# Patient Record
Sex: MALE | Race: OTHER | Employment: FULL TIME | URBAN - METROPOLITAN AREA
[De-identification: names, ages, dates, MRNs, and addresses within clinical notes are randomized per-mention and may not be internally consistent; named-entity substitution may affect disease eponyms.]

---

## 2017-07-13 ENCOUNTER — OFFICE VISIT (OUTPATIENT)
Dept: FAMILY MEDICINE CLINIC | Age: 42
End: 2017-07-13

## 2017-07-13 VITALS
OXYGEN SATURATION: 95 % | HEART RATE: 67 BPM | TEMPERATURE: 98.6 F | DIASTOLIC BLOOD PRESSURE: 76 MMHG | HEIGHT: 72 IN | RESPIRATION RATE: 17 BRPM | BODY MASS INDEX: 31.64 KG/M2 | SYSTOLIC BLOOD PRESSURE: 117 MMHG | WEIGHT: 233.6 LBS

## 2017-07-13 DIAGNOSIS — G62.9 NEUROPATHY: Primary | ICD-10-CM

## 2017-07-13 DIAGNOSIS — E66.9 NON MORBID OBESITY, UNSPECIFIED OBESITY TYPE: ICD-10-CM

## 2017-07-13 PROBLEM — M21.42 PES PLANUS OF BOTH FEET: Status: ACTIVE | Noted: 2017-07-13

## 2017-07-13 PROBLEM — M21.41 PES PLANUS OF BOTH FEET: Status: ACTIVE | Noted: 2017-07-13

## 2017-07-13 RX ORDER — GABAPENTIN 100 MG/1
100 CAPSULE ORAL 3 TIMES DAILY
Qty: 90 CAP | Refills: 0 | Status: SHIPPED | OUTPATIENT
Start: 2017-07-13 | End: 2018-04-23 | Stop reason: DRUGHIGH

## 2017-07-13 NOTE — LETTER
NOTIFICATION RETURN TO WORK / SCHOOL 
 
7/13/2017 2:53 PM 
 
Mr. Aurora Eng [address] [place] [id number] To Whom It May Concern: 
 
Aurora Eng is currently under the care of [facility]. He will return to work/school on: 7/14/17 If there are questions or concerns please have the patient contact our office.  
 
 
 
Sincerely, 
 
 
Cheryl Carrero MD

## 2017-07-13 NOTE — PATIENT INSTRUCTIONS
Neuropathic Pain: Care Instructions  Your Care Instructions  Neuropathic pain is caused by pressure on or damage to your nerves. It's often simply called nerve pain. Some people feel this type of pain all the time. For others, it comes and goes. Diabetes, shingles, or an injury can cause nerve pain. Many people say the pain feels sharp, burning, or stabbing. But some people feel it as a dull ache. In some cases, it makes your skin very sensitive. So touch, pressure, and other sensations that did not hurt before may now cause pain. It's important to know that this kind of pain is real and can affect your quality of life. It's also important to know that treatment can help. Treatment includes pain medicines, exercise, and physical therapy. Medicines can help reduce the number of pain signals that travel over the nerves. This can make the painful areas less sensitive. It can also help you sleep better and improve your mood. But medicines are only one part of successful treatment. Most people do best with more than one kind of treatment. Your doctor may recommend that you try cognitive-behavioral therapy and stress management. Or, if needed, you may decide to try to quit smoking, lower your blood pressure, or better control blood sugar. These kinds of healthy changes can also make a difference. If you feel that your treatment is not working, talk to your doctor. And be sure to tell your doctor if you think you might be depressed or anxious. These are common problems that can also be treated. Follow-up care is a key part of your treatment and safety. Be sure to make and go to all appointments, and call your doctor if you are having problems. It's also a good idea to know your test results and keep a list of the medicines you take. How can you care for yourself at home? · Be safe with medicines. Read and follow all instructions on the label.   ¨ If the doctor gave you a prescription medicine for pain, take it as prescribed. ¨ If you are not taking a prescription pain medicine, ask your doctor if you can take an over-the-counter medicine. · Save hard tasks for days when you have less pain. Follow a hard task with an easy task. And remember to take breaks. · Relax, and reduce stress. You may want to try deep breathing or meditation. These can help. · Keep moving. Gentle, daily exercise can help reduce pain. Your doctor or physical therapist can tell you what type of exercise is best for you. This may include walking, swimming, and stationary biking. It may also include stretches and range-of-motion exercises. · Try heat, cold packs, and massage. · Get enough sleep. Constant pain can make you more tired. If the pain makes it hard to sleep, talk with your doctor. · Think positively. Your thoughts can affect your pain. Do fun things to distract yourself from the pain. See a movie, read a book, listen to music, or spend time with a friend. · Keep a pain diary. Try to write down how strong your pain is and what it feels like. Also try to notice and write down how your moods, thoughts, sleep, activities, and medicine affect your pain. These notes can help you and your doctor find the best ways to treat your pain. Reducing constipation caused by pain medicine  Pain medicines often cause constipation. To reduce constipation:  · Include fruits, vegetables, beans, and whole grains in your diet each day. These foods are high in fiber. · Drink plenty of fluids, enough so that your urine is light yellow or clear like water. If you have kidney, heart, or liver disease and have to limit fluids, talk with your doctor before you increase the amount of fluids you drink. · Get some exercise every day. Build up slowly to 30 to 60 minutes a day on 5 or more days of the week. · Take a fiber supplement, such as Citrucel or Metamucil, every day if needed. Read and follow all instructions on the label.   · Schedule time each day for a bowel movement. Having a daily routine may help. Take your time and do not strain when having a bowel movement. · Ask your doctor about a laxative. The goal is to have one easy bowel movement every 1 to 2 days. Do not let constipation go untreated for more than 3 days. When should you call for help? Call your doctor now or seek immediate medical care if:  · You feel sad, anxious, or hopeless for more than a few days. This could mean you are depressed. Depression is common in people who have a lot of pain. But it can be treated. · You have trouble with bowel movements, such as:  ¨ No bowel movement in 3 days. ¨ Blood in the anal area, in your stool, or on the toilet paper. ¨ Diarrhea for more than 24 hours. Watch closely for changes in your health, and be sure to contact your doctor if:  · Your pain is getting worse. · You can't sleep because of pain. · You are very worried or anxious about your pain. · You have trouble taking your pain medicine. · You have any concerns about your pain medicine or its side effects. · You have vomiting or cramps for more than 2 hours. Where can you learn more? Go to http://xochilt-mirza.info/. Enter K877 in the search box to learn more about \"Neuropathic Pain: Care Instructions. \"  Current as of: October 14, 2016  Content Version: 11.3  © 0545-7262 [employer]. Care instructions adapted under license by [employer] (which disclaims liability or warranty for this information). If you have questions about a medical condition or this instruction, always ask your healthcare professional. [place] [address] any warranty or liability for your use of this information.

## 2017-07-13 NOTE — PROGRESS NOTES
Chief Complaint   Patient presents with   [facility]    Foot Pain     pt states having pain at the bottom at both feet. .. pt states foot pain occurs when walking. .. pt rates pain as a 7 on pain scale. . pt describes burning and tingling at times on the bottom feet     1. Have you been to the ER, urgent care clinic since your last visit? Hospitalized since your last visit? No    2. Have you seen or consulted any other health care providers outside of the [address] [place] [address] since your last visit? Include any pap smears or colon screening.  No

## 2017-07-13 NOTE — MR AVS SNAPSHOT
Visit Information Date & Time Provider Department Dept. Phone Encounter #  
 7/13/2017  2:05 PM Clearance Mode, Alie Valdez [place] [phone] [id number] Follow-up Instructions Return in about 2 months (around 9/13/2017) for neuropathy follow-up. Upcoming Health Maintenance Date Due DTaP/Tdap/Td series (1 - Tdap) 7/17/1998 INFLUENZA AGE 9 TO ADULT 8/1/2017 Allergies as of 7/13/2017  Review Complete On: 7/13/2017 By: Jorgito Mac MD  
 No Known Allergies Current Immunizations  Never Reviewed No immunizations on file. Not reviewed this visit You Were Diagnosed With   
  
 Codes Comments Non morbid obesity, unspecified obesity type    -  Primary ICD-10-CM: E66.9 ICD-9-CM: 278.00 Neuropathy ([address]     ICD-10-CM: G62.9 ICD-9-CM: 494. 9 Vitals BP Pulse Temp Resp Height(growth percentile) Weight(growth percentile) 117/76 67 98.6 °F (37 °C) (Oral) 17 5' 11.85\" (1.825 m) 233 lb 9.6 oz (106 kg) SpO2 BMI Smoking Status 95% 31.81 kg/m2 Former Smoker Vitals History BMI and BSA Data Body Mass Index Body Surface Area  
 31.81 kg/m 2 2.32 m 2 Preferred Pharmacy Pharmacy Name Phone [address] [facility] AT [address] [place] [address]. [phone] Your Updated Medication List  
  
   
This list is accurate as of: 7/13/17  2:33 PM.  Always use your most recent med list.  
  
  
  
  
 gabapentin 100 mg capsule Commonly known as:  NEURONTIN Take 1 Cap by mouth three (3) times daily. HYDROcodone-acetaminophen 5-500 mg per tablet Commonly known as:  Vish Halls Take 1 Tab by mouth every four (4) hours as needed for Pain. Prescriptions Sent to Pharmacy Refills  
 gabapentin (NEURONTIN) 100 mg capsule 0 Sig: Take 1 Cap by mouth three (3) times daily.   
 Class: Normal  
 Pharmacy: [facility] [address] [place] [address], [address] [place] [address] AT [address] [facility].  #: [phone] Route: Oral  
  
We Performed the Following CBC W/O DIFF [87457 CPT(R)] HEMOGLOBIN A1C WITH EAG [94040 CPT(R)] LIPID PANEL [99877 CPT(R)] METABOLIC PANEL, COMPREHENSIVE [89299 CPT(R)] TSH REFLEX TO T4 [[id number] Custom] VITAMIN B12 [id number] CPT(R)] Follow-up Instructions Return in about 2 months (around 9/13/2017) for neuropathy follow-up. Patient Instructions Neuropathic Pain: Care Instructions Your Care Instructions Neuropathic pain is caused by pressure on or damage to your nerves. It's often simply called nerve pain. Some people feel this type of pain all the time. For others, it comes and goes. Diabetes, shingles, or an injury can cause nerve pain. Many people say the pain feels sharp, burning, or stabbing. But some people feel it as a dull ache. In some cases, it makes your skin very sensitive. So touch, pressure, and other sensations that did not hurt before may now cause pain. It's important to know that this kind of pain is real and can affect your quality of life. It's also important to know that treatment can help. Treatment includes pain medicines, exercise, and physical therapy. Medicines can help reduce the number of pain signals that travel over the nerves. This can make the painful areas less sensitive. It can also help you sleep better and improve your mood. But medicines are only one part of successful treatment. Most people do best with more than one kind of treatment. Your doctor may recommend that you try cognitive-behavioral therapy and stress management. Or, if needed, you may decide to try to quit smoking, lower your blood pressure, or better control blood sugar. These kinds of healthy changes can also make a difference. If you feel that your treatment is not working, talk to your doctor.  And be sure to tell your doctor if you think you might be depressed or anxious. These are common problems that can also be treated. Follow-up care is a key part of your treatment and safety. Be sure to make and go to all appointments, and call your doctor if you are having problems. It's also a good idea to know your test results and keep a list of the medicines you take. How can you care for yourself at home? · Be safe with medicines. Read and follow all instructions on the label. ¨ If the doctor gave you a prescription medicine for pain, take it as prescribed. ¨ If you are not taking a prescription pain medicine, ask your doctor if you can take an over-the-counter medicine. · Save hard tasks for days when you have less pain. Follow a hard task with an easy task. And remember to take breaks. · Relax, and reduce stress. You may want to try deep breathing or meditation. These can help. · Keep moving. Gentle, daily exercise can help reduce pain. Your doctor or physical therapist can tell you what type of exercise is best for you. This may include walking, swimming, and stationary biking. It may also include stretches and range-of-motion exercises. · Try heat, cold packs, and massage. · Get enough sleep. Constant pain can make you more tired. If the pain makes it hard to sleep, talk with your doctor. · Think positively. Your thoughts can affect your pain. Do fun things to distract yourself from the pain. See a movie, read a book, listen to music, or spend time with a friend. · Keep a pain diary. Try to write down how strong your pain is and what it feels like. Also try to notice and write down how your moods, thoughts, sleep, activities, and medicine affect your pain. These notes can help you and your doctor find the best ways to treat your pain. Reducing constipation caused by pain medicine Pain medicines often cause constipation. To reduce constipation: · Include fruits, vegetables, beans, and whole grains in your diet each day. These foods are high in fiber. · Drink plenty of fluids, enough so that your urine is light yellow or clear like water. If you have kidney, heart, or liver disease and have to limit fluids, talk with your doctor before you increase the amount of fluids you drink. · Get some exercise every day. Build up slowly to 30 to 60 minutes a day on 5 or more days of the week. · Take a fiber supplement, such as Citrucel or Metamucil, every day if needed. Read and follow all instructions on the label. · Schedule time each day for a bowel movement. Having a daily routine may help. Take your time and do not strain when having a bowel movement. · Ask your doctor about a laxative. The goal is to have one easy bowel movement every 1 to 2 days. Do not let constipation go untreated for more than 3 days. When should you call for help? Call your doctor now or seek immediate medical care if: 
· You feel sad, anxious, or hopeless for more than a few days. This could mean you are depressed. Depression is common in people who have a lot of pain. But it can be treated. · You have trouble with bowel movements, such as: 
¨ No bowel movement in 3 days. ¨ Blood in the anal area, in your stool, or on the toilet paper. ¨ Diarrhea for more than 24 hours. Watch closely for changes in your health, and be sure to contact your doctor if: 
· Your pain is getting worse. · You can't sleep because of pain. · You are very worried or anxious about your pain. · You have trouble taking your pain medicine. · You have any concerns about your pain medicine or its side effects. · You have vomiting or cramps for more than 2 hours. Where can you learn more? Go to http://xochilt-mirza.info/. Enter R347 in the search box to learn more about \"Neuropathic Pain: Care Instructions. \" Current as of: October 14, 2016 Content Version: 11.3 © 0605-7999 [employer]. Care instructions adapted under license by [employer] (which disclaims liability or warranty for this information). If you have questions about a medical condition or this instruction, always ask your healthcare professional. [address] any warranty or liability for your use of this information. Introducing [facility]! William Leslie introduces Massdrop patient portal. Now you can access parts of your medical record, email your doctor's office, and request medication refills online. 1. In your internet browser, go to [email or web address] 2. Click on the First Time User? Click Here link in the Sign In box. You will see the New Member Sign Up page. 3. Enter your Massdrop Access Code exactly as it appears below. You will not need to use this code after youve completed the sign-up process. If you do not sign up before the expiration date, you must request a new code. · Massdrop Access Code: [email or web address] Expires: 10/11/2017  2:33 PM 
 
4. Enter the last four digits of your Social Security Number (xxxx) and Date of Birth (mm/dd/yyyy) as indicated and click Submit. You will be taken to the next sign-up page. 5. Create a Massdrop ID. This will be your Massdrop login ID and cannot be changed, so think of one that is secure and easy to remember. 6. Create a Massdrop password. You can change your password at any time. 7. Enter your Password Reset Question and Answer. This can be used at a later time if you forget your password. 8. Enter your e-mail address. You will receive e-mail notification when new information is available in [address] 9. Click Sign Up. You can now view and download portions of your medical record. 10. Click the Download Summary menu link to download a portable copy of your medical information.  
 
If you have questions, please visit the Frequently Asked Questions section of the [employer]. Remember, [employer]ark[employer] is NOT to be used for urgent needs. For medical emergencies, dial 911. Now available from your iPhone and Android! Please provide this summary of care documentation to your next provider. Your primary care clinician is listed as Sree Turk. If you have any questions after today's visit, please call [phone].

## 2017-07-13 NOTE — PROGRESS NOTES
47 yo male here to establish care  Has bilateral foot pain     Told that he had flat feet    Pain there in the morning and at night gets more sensitive   Has tried ibuprofen    Never had DM screening    Will work up for neuropathy -- may need referral to Neurology     Will try treatment for neuropathy with gabapentin    I reviewed with the resident the medical history and the resident's findings on the physical examination. I discussed with the resident the patient's diagnosis and concur with the plan.

## 2017-07-13 NOTE — PROGRESS NOTES
Subjective  Lambert Bray is an 46 y.o. male who presents as a new patient to [facility]. Was previously seen at [address] [place] [address]. Last visit 1 year ago. 3 years ago of b/l foot (sole, lateral and medial sides of foot) pain. Worsening over the past few months. Was told that he had flat feet. Has tried insoles, special shoes with no relief. Constant pain, even upon waking. Very sensitive at night. Pain localized over sole, medial and lateral aspects of feet. Sharp, burning pain. Also has tingling. No weakness. Not related to activity. Feels numb after a few hours. Takes ibuprofen which does not help. Ice helps slightly for 30 min to an hour. Allergies - reviewed:   No Known Allergies      Medications - reviewed:   Current Outpatient Prescriptions   Medication Sig    gabapentin (NEURONTIN) 100 mg capsule Take 1 Cap by mouth three (3) times daily.  HYDROcodone-acetaminophen (LORTAB) 5-500 mg per tablet Take 1 Tab by mouth every four (4) hours as needed for Pain. No current facility-administered medications for this visit. Past Medical History - reviewed:  History reviewed. No pertinent past medical history. Past Surgical History - reviewed:   Past Surgical History:   Procedure Laterality Date    HX CHOLECYSTECTOMY      HX ORTHOPAEDIC      right hand surgery 12  years ago         Social History - reviewed:  Social History     Social History    Marital status:      Spouse name: N/A    Number of children: N/A    Years of education: N/A     Occupational History    Not on file.      Social History Main Topics    Smoking status: Former Smoker    Smokeless tobacco: Never Used    Alcohol use 0.6 oz/week     1 Cans of beer per week      Comment: social, 1 beer / 2 months    Drug use: No    Sexual activity: Yes     Partners: Female     Other Topics Concern    Not on file     Social History Narrative         Family History - reviewed:  Family History   Problem Relation Age of Onset    Diabetes Mother     Hypertension Mother     Hypertension Father     No Known Problems Sister     No Known Problems Brother          Immunizations - reviewed: There is no immunization history on file for this patient. ROS  CONSTITUTIONAL: Denies: fever  CARDIOVASCULAR: Denies: chest pain  RESPIRATORY: Denies: cough  GI: Denies: abdominal pain  NEURO: numbness/tingling  MUSCULOSKELETAL: b/l foot pain  SKIN: Denies: rash      Physical Exam  Visit Vitals    /76    Pulse 67    Temp 98.6 °F (37 °C) (Oral)    Resp 17    Ht 5' 11.85\" (1.825 m)    Wt 233 lb 9.6 oz (106 kg)    SpO2 95%    BMI 31.81 kg/m2       General appearance - alert, well appearing, and in no distress, overweight  Eyes - EOMI  Mouth - mucous membranes moist, pharynx normal without lesions  Neck - supple, no significant adenopathy  Chest - clear to auscultation, no wheezes, rales or rhonchi, symmetric air entry  Heart - normal rate, regular rhythm, normal S1, S2, no murmurs, rubs, clicks or gallops  Abdomen - soft, nontender, nondistended, no masses or organomegaly  Neurological - alert, oriented, normal speech, no focal findings or movement disorder noted  Musculoskeletal - fine sensation to pinprick intact in b/l feet (dorsal and plantar surfaces)  Extremities - peripheral pulses normal, no pedal edema, no clubbing, slightly blue plantar surface of b/l feet, JAMES 1.37  Skin - slightly purple/blue soles of b/l feet      Assessment/Plan    ICD-10-CM ICD-9-CM    1. Non morbid obesity, unspecified obesity type E66.9 278.00 HEMOGLOBIN A1C WITH EAG      CBC W/O DIFF      METABOLIC PANEL, COMPREHENSIVE      LIPID PANEL      TSH REFLEX TO T4   2. Neuropathy ([facility]) G62.9 355.9 HEMOGLOBIN A1C WITH EAG      CBC W/O DIFF      METABOLIC PANEL, COMPREHENSIVE      LIPID PANEL      TSH REFLEX TO T4      VITAMIN B12      gabapentin (NEURONTIN) 100 mg capsule       Symptoms consistent with neuropathy.  Unclear etiology however; will check routine labs including diabetes. Complaints not c/w claudication, and normal JAMES this visit seems to support the same. Will start gabapentin and look for other causes of peripheral neuropathy, including B12 deficiency and thyroid dysfunction. Advised that patient f/u in 2 months. May need referral to neurology for EMG if persistent symptoms without obvious cause. Follow-up Disposition:  Return in about 2 months (around 9/13/2017) for neuropathy follow-up. I have discussed the diagnosis with the patient and the intended plan as seen in the above orders. The patient has received an after-visit summary and questions were answered concerning future plans. I have discussed medication side effects and warnings with the patient as well. Chandana Pepe MD  Family Medicine Resident    Patient discussed with Dr. Ileana Clark, attending physician.

## 2017-07-14 LAB
ALBUMIN SERPL-MCNC: 4.5 G/DL (ref 3.5–5.5)
ALBUMIN/GLOB SERPL: 1.6 {RATIO} (ref 1.2–2.2)
ALP SERPL-CCNC: 43 IU/L (ref 39–117)
ALT SERPL-CCNC: 13 IU/L (ref 0–44)
AST SERPL-CCNC: 20 IU/L (ref 0–40)
BILIRUB SERPL-MCNC: 0.5 MG/DL (ref 0–1.2)
BUN SERPL-MCNC: 20 MG/DL (ref 6–20)
BUN/CREAT SERPL: 21 (ref 9–20)
CALCIUM SERPL-MCNC: 9.5 MG/DL (ref 8.7–10.2)
CHLORIDE SERPL-SCNC: 99 MMOL/L (ref 96–106)
CHOLEST SERPL-MCNC: 157 MG/DL (ref 100–199)
CO2 SERPL-SCNC: 25 MMOL/L (ref 18–29)
CREAT SERPL-MCNC: 0.95 MG/DL (ref 0.76–1.27)
ERYTHROCYTE [DISTWIDTH] IN BLOOD BY AUTOMATED COUNT: 13.6 % (ref 12.3–15.4)
EST. AVERAGE GLUCOSE BLD GHB EST-MCNC: 105 MG/DL
GLOBULIN SER CALC-MCNC: 2.9 G/DL (ref 1.5–4.5)
GLUCOSE SERPL-MCNC: 56 MG/DL (ref 65–99)
HBA1C MFR BLD: 5.3 % (ref 4.8–5.6)
HCT VFR BLD AUTO: 41.9 % (ref 37.5–51)
HDLC SERPL-MCNC: 46 MG/DL
HGB BLD-MCNC: 13.6 G/DL (ref 12.6–17.7)
INTERPRETATION, 910389: NORMAL
LDLC SERPL CALC-MCNC: 81 MG/DL (ref 0–99)
MCH RBC QN AUTO: 29.7 PG (ref 26.6–33)
MCHC RBC AUTO-ENTMCNC: 32.5 G/DL (ref 31.5–35.7)
MCV RBC AUTO: 92 FL (ref 79–97)
PLATELET # BLD AUTO: 219 X10E3/UL (ref 150–379)
POTASSIUM SERPL-SCNC: 4.5 MMOL/L (ref 3.5–5.2)
PROT SERPL-MCNC: 7.4 G/DL (ref 6–8.5)
RBC # BLD AUTO: 4.58 X10E6/UL (ref 4.14–5.8)
SODIUM SERPL-SCNC: 140 MMOL/L (ref 134–144)
TRIGL SERPL-MCNC: 148 MG/DL (ref 0–149)
VIT B12 SERPL-MCNC: 413 PG/ML (ref 211–946)
VLDLC SERPL CALC-MCNC: 30 MG/DL (ref 5–40)
WBC # BLD AUTO: 7.2 X10E3/UL (ref 3.4–10.8)

## 2017-07-15 LAB — SPECIMEN STATUS REPORT, ROLRST: NORMAL

## 2017-11-28 ENCOUNTER — HOSPITAL ENCOUNTER (EMERGENCY)
Age: 42
Discharge: HOME | End: 2017-11-28
Attending: EMERGENCY MEDICINE
Payer: SUBSIDIZED

## 2017-11-28 ENCOUNTER — APPOINTMENT (OUTPATIENT)
Dept: GENERAL RADIOLOGY | Age: 42
End: 2017-11-28
Attending: EMERGENCY MEDICINE
Payer: SUBSIDIZED

## 2017-11-28 VITALS
RESPIRATION RATE: 18 BRPM | WEIGHT: 225 LBS | DIASTOLIC BLOOD PRESSURE: 76 MMHG | TEMPERATURE: 98.4 F | OXYGEN SATURATION: 97 % | BODY MASS INDEX: 30.48 KG/M2 | HEIGHT: 72 IN | HEART RATE: 68 BPM | SYSTOLIC BLOOD PRESSURE: 127 MMHG

## 2017-11-28 DIAGNOSIS — M72.2 PLANTAR FASCIITIS: Primary | ICD-10-CM

## 2017-11-28 LAB
ANION GAP BLD CALC-SCNC: 17 MMOL/L (ref 5–15)
BUN BLD-MCNC: 22 MG/DL (ref 9–20)
CA-I BLD-MCNC: 1.22 MMOL/L (ref 1.12–1.32)
CHLORIDE BLD-SCNC: 104 MMOL/L (ref 98–107)
CO2 BLD-SCNC: 27 MMOL/L (ref 21–32)
CREAT BLD-MCNC: 1 MG/DL (ref 0.6–1.3)
GLUCOSE BLD-MCNC: 95 MG/DL (ref 65–100)
HCT VFR BLD CALC: 42 % (ref 36.6–50.3)
HGB BLD-MCNC: 14.3 GM/DL (ref 12.1–17)
POTASSIUM BLD-SCNC: 4.3 MMOL/L (ref 3.5–5.1)
SERVICE CMNT-IMP: ABNORMAL
SODIUM BLD-SCNC: 143 MMOL/L (ref 136–145)

## 2017-11-28 PROCEDURE — 99283 EMERGENCY DEPT VISIT LOW MDM: CPT

## 2017-11-28 PROCEDURE — 73630 X-RAY EXAM OF FOOT: CPT

## 2017-11-28 PROCEDURE — 36415 COLL VENOUS BLD VENIPUNCTURE: CPT | Performed by: EMERGENCY MEDICINE

## 2017-11-28 PROCEDURE — 80047 BASIC METABLC PNL IONIZED CA: CPT

## 2017-11-28 PROCEDURE — 73620 X-RAY EXAM OF FOOT: CPT

## 2017-11-28 RX ORDER — NAPROXEN 375 MG/1
375 TABLET ORAL 2 TIMES DAILY WITH MEALS
Qty: 20 TAB | Refills: 0 | Status: SHIPPED | OUTPATIENT
Start: 2017-11-28 | End: 2017-12-05

## 2017-11-28 NOTE — ED TRIAGE NOTES
\"I got a severe pain in both feet. I got this problem for the last 6 years but it has been increasing from time to time and the last 6 months has been the worst. The pain is the worst, that's why I came. \" Patient was seen by an MD last July but only had lab work performed, nothing unusual.

## 2017-11-29 NOTE — ED PROVIDER NOTES
HPI Comments: 38 y.o. male with no significant past medical history who presents from home with chief complaint of foot pain. Pt has had pain and tingling to the plantar aspect of his feet bilaterally that hurts most with his first step out of bed in the morning and then continues throughout the day. He is also experiencing intermittent \"nerve pain\" up through his calves. Pt completed PT last year for plantar fascitis and was given Neurontin at that time but has had no relief of symptoms. Pt denies any recent injuries, does not do work on ladders, and is not a runner. Pt denies hx of DM or gout. Pt denies back pain. There are no other acute medical concerns at this time. Social hx: former tobacco smoker; (+) EtOH use (social 1 beer/2 months)  PCP: Andrew Neal MD    Note written by Zoey Mason, as dictated by Valarie Acuna MD 7:14 PM    The history is provided by the patient. No  was used. No past medical history on file. Past Surgical History:   Procedure Laterality Date    HX CHOLECYSTECTOMY      HX ORTHOPAEDIC      right hand surgery 12  years ago         Family History:   Problem Relation Age of Onset    Diabetes Mother     Hypertension Mother     Hypertension Father     No Known Problems Sister     No Known Problems Brother        Social History     Social History    Marital status:      Spouse name: N/A    Number of children: N/A    Years of education: N/A     Occupational History    Not on file. Social History Main Topics    Smoking status: Former Smoker    Smokeless tobacco: Never Used    Alcohol use 0.6 oz/week     1 Cans of beer per week      Comment: social, 1 beer / 2 months    Drug use: No    Sexual activity: Yes     Partners: Female     Other Topics Concern    Not on file     Social History Narrative         ALLERGIES: Review of patient's allergies indicates no known allergies.     Review of Systems   Constitutional: Negative for fever. Respiratory: Negative for shortness of breath. Cardiovascular: Negative for chest pain. Gastrointestinal: Negative for abdominal pain. Musculoskeletal: Positive for myalgias (BL foot pain). Negative for back pain. All other systems reviewed and are negative. Vitals:    11/28/17 1852   BP: 127/76   Pulse: 68   Resp: 18   Temp: 98.4 °F (36.9 °C)   SpO2: 97%   Weight: 102.1 kg (225 lb)   Height: 6' (1.829 m)            Physical Exam   Constitutional: He is oriented to person, place, and time. He appears well-developed and well-nourished. No distress. HENT:   Head: Normocephalic and atraumatic. Eyes: Conjunctivae are normal. No scleral icterus. Neck: Neck supple. No tracheal deviation present. Cardiovascular: Normal rate, regular rhythm, normal heart sounds and intact distal pulses. Exam reveals no gallop and no friction rub. No murmur heard. Pulmonary/Chest: Effort normal and breath sounds normal. He has no wheezes. He has no rales. Abdominal: Soft. He exhibits no distension. There is no tenderness. There is no rebound and no guarding. Musculoskeletal: He exhibits no edema. Tenderness over different areas of the feet. Neurological: He is alert and oriented to person, place, and time. Skin: Skin is warm and dry. No rash noted. Psychiatric: He has a normal mood and affect. Nursing note and vitals reviewed.      Note written by Zoey Graham, as dictated by Chika Forman MD 7:14 PM    [facility]  ED Course       Procedures

## 2018-04-03 ENCOUNTER — OFFICE VISIT (OUTPATIENT)
Dept: FAMILY MEDICINE CLINIC | Age: 43
End: 2018-04-03

## 2018-04-03 VITALS
DIASTOLIC BLOOD PRESSURE: 84 MMHG | TEMPERATURE: 98 F | SYSTOLIC BLOOD PRESSURE: 127 MMHG | HEART RATE: 77 BPM | BODY MASS INDEX: 31.68 KG/M2 | RESPIRATION RATE: 18 BRPM | OXYGEN SATURATION: 98 % | HEIGHT: 73 IN | WEIGHT: 239 LBS

## 2018-04-03 DIAGNOSIS — M72.2 PLANTAR FASCIITIS, BILATERAL: Primary | ICD-10-CM

## 2018-04-03 DIAGNOSIS — M79.672 PAIN IN BOTH FEET: ICD-10-CM

## 2018-04-03 DIAGNOSIS — M79.671 PAIN IN BOTH FEET: ICD-10-CM

## 2018-04-03 RX ORDER — DICLOFENAC SODIUM 75 MG/1
75 TABLET, DELAYED RELEASE ORAL 2 TIMES DAILY
Qty: 20 TAB | Refills: 0 | Status: SHIPPED | OUTPATIENT
Start: 2018-04-03 | End: 2018-05-08 | Stop reason: ALTCHOICE

## 2018-04-03 NOTE — PATIENT INSTRUCTIONS
[facility]    [phone] [address] [facility], [address], [address]       Plantar Fasciitis: Exercises  Your Care Instructions  Here are some examples of typical rehabilitation exercises for your condition. Start each exercise slowly. Ease off the exercise if you start to have pain. Your doctor or physical therapist will tell you when you can start these exercises and which ones will work best for you. How to do the exercises  Towel stretch    1. Sit with your legs extended and knees straight. 2. Place a towel around your foot just under the toes. 3. Hold each end of the towel in each hand, with your hands above your knees. 4. Pull back with the towel so that your foot stretches toward you. 5. Hold the position for at least 15 to 30 seconds. 6. Repeat 2 to 4 times a session, up to 5 sessions a day. Calf stretch    This exercise stretches the muscles at the back of the lower leg (the calf) and the Achilles tendon. Do this exercise 3 or 4 times a day, 5 days a week. 1. Stand facing a wall with your hands on the wall at about eye level. Put the leg you want to stretch about a step behind your other leg. 2. Keeping your back heel on the floor, bend your front knee until you feel a stretch in the back leg. 3. Hold the stretch for 15 to 30 seconds. Repeat 2 to 4 times. Plantar fascia and calf stretch    Stretching the plantar fascia and calf muscles can increase flexibility and decrease heel pain. You can do this exercise several times each day and before and after activity. 1. Stand on a step as shown above. Be sure to hold on to the banister. 2. Slowly let your heels down over the edge of the step as you relax your calf muscles. You should feel a gentle stretch across the bottom of your foot and up the back of your leg to your knee.   3. Hold the stretch about 15 to 30 seconds, and then tighten your calf muscle a little to bring your heel back up to the level of the step. Repeat 2 to 4 times. Towel curls    Make this exercise more challenging by placing a weighted object, such as a soup can, on the other end of the towel. 1. While sitting, place your foot on a towel on the floor and scrunch the towel toward you with your toes. 2. Then, also using your toes, push the towel away from you. [place] pickups    1. Put marbles on the floor next to a cup.  2. Using your toes, try to lift the marbles up from the floor and put them in the cup. Follow-up care is a key part of your treatment and safety. Be sure to make and go to all appointments, and call your doctor if you are having problems. It's also a good idea to know your test results and keep a list of the medicines you take. Where can you learn more? Go to http://xochilt-mirza.info/. Al Leo in the search box to learn more about \"Plantar Fasciitis: Exercises. \"  Current as of: March 21, 2017  Content Version: 11.4  © 9810-9435 Healthwise, Incorporated. Care instructions adapted under license by [employer] (which disclaims liability or warranty for this information). If you have questions about a medical condition or this instruction, always ask your healthcare professional. [address] any warranty or liability for your use of this information.

## 2018-04-03 NOTE — MR AVS SNAPSHOT
[address] 
[phone] Patient: Marina Becerra MRN: [id number] :1975 Visit Information Date & Time Provider Department Dept. Phone Encounter #  
 4/3/2018  5:30 PM Jitendra Sanabria, [facility]5 [facility] [phone] [id number] Upcoming Health Maintenance Date Due DTaP/Tdap/Td series (1 - Tdap) 1998 Influenza Age 5 to Adult 2017 Allergies as of 4/3/2018  Review Complete On: 4/3/2018 By: Robinson Florez No Known Allergies Current Immunizations  Never Reviewed No immunizations on file. Not reviewed this visit You Were Diagnosed With   
  
 Codes Comments Pain in both feet    -  Primary ICD-10-CM: M79.671, K86.545 ICD-9-CM: 729.5 Vitals BP Pulse Temp Resp Height(growth percentile) Weight(growth percentile) 127/84 (BP 1 Location: Left arm, BP Patient Position: Sitting) 77 98 °F (36.7 °C) (Oral) 18 6' 1\" (1.854 m) 239 lb (108.4 kg) SpO2 BMI Smoking Status 98% 31.53 kg/m2 Former Smoker Vitals History BMI and BSA Data Body Mass Index Body Surface Area  
 31.53 kg/m 2 2.36 m 2 Preferred Pharmacy Pharmacy Name Phone [address] [facility] AT [address] [place] [address]. [phone] Your Updated Medication List  
  
   
This list is accurate as of 4/3/18  6:08 PM.  Always use your most recent med list.  
  
  
  
  
 diclofenac EC 75 mg EC tablet Commonly known as:  VOLTAREN Take 1 Tab by mouth two (2) times a day.  
  
 gabapentin 100 mg capsule Commonly known as:  NEURONTIN Take 1 Cap by mouth three (3) times daily. HYDROcodone-acetaminophen 5-500 mg per tablet Commonly known as:  Philemon Bushy Take 1 Tab by mouth every four (4) hours as needed for Pain. Prescriptions Sent to Pharmacy Refills diclofenac EC (VOLTAREN) 75 mg EC tablet 0 Sig: Take 1 Tab by mouth two (2) times a day. Class: Normal  
 Pharmacy: [facility] [address] [place] [address] 20 [facility] AT [address] [facility] [address].  #: [phone] Route: Oral  
  
Patient Instructions [facility] 
 
[phone] 7[address] [facility], [address] Plantar Fasciitis: Exercises Your Care Instructions Here are some examples of typical rehabilitation exercises for your condition. Start each exercise slowly. Ease off the exercise if you start to have pain. Your doctor or physical therapist will tell you when you can start these exercises and which ones will work best for you. How to do the exercises Towel stretch 1. Sit with your legs extended and knees straight. 2. Place a towel around your foot just under the toes. 3. Hold each end of the towel in each hand, with your hands above your knees. 4. Pull back with the towel so that your foot stretches toward you. 5. Hold the position for at least 15 to 30 seconds. 6. Repeat 2 to 4 times a session, up to 5 sessions a day. Calf stretch This exercise stretches the muscles at the back of the lower leg (the calf) and the Achilles tendon. Do this exercise 3 or 4 times a day, 5 days a week. 1. Stand facing a wall with your hands on the wall at about eye level. Put the leg you want to stretch about a step behind your other leg. 2. Keeping your back heel on the floor, bend your front knee until you feel a stretch in the back leg. 3. Hold the stretch for 15 to 30 seconds. Repeat 2 to 4 times. Plantar fascia and calf stretch Stretching the plantar fascia and calf muscles can increase flexibility and decrease heel pain. You can do this exercise several times each day and before and after activity. 1. Stand on a step as shown above. Be sure to hold on to the banister. 2. Slowly let your heels down over the edge of the step as you relax your calf muscles. You should feel a gentle stretch across the bottom of your foot and up the back of your leg to your knee. 3. Hold the stretch about 15 to 30 seconds, and then tighten your calf muscle a little to bring your heel back up to the level of the step. Repeat 2 to 4 times. Towel curls Make this exercise more challenging by placing a weighted object, such as a soup can, on the other end of the towel. 1. While sitting, place your foot on a towel on the floor and scrunch the towel toward you with your toes. 2. Then, also using your toes, push the towel away from you. [place] pickups 1. Put marbles on the floor next to a cup. 
2. Using your toes, try to lift the marbles up from the floor and put them in the cup. Follow-up care is a key part of your treatment and safety. Be sure to make and go to all appointments, and call your doctor if you are having problems. It's also a good idea to know your test results and keep a list of the medicines you take. Where can you learn more? Go to http://xochilt-mirza.info/. Shaina Honeycutt in the search box to learn more about \"Plantar Fasciitis: Exercises. \" Current as of: March 21, 2017 Content Version: 11.4 © 9490-7700 Healthwise, Incorporated. Care instructions adapted under license by [employer] (which disclaims liability or warranty for this information). If you have questions about a medical condition or this instruction, always ask your healthcare professional. [place] [address] any warranty or liability for your use of this information. Introducing [facility]! Mary Lou Forte introduces "[employer] patient portal. Now you can access parts of your medical record, email your doctor's office, and request medication refills online. 1. In your internet browser, go to [email or web address] 2. Click on the First Time User? Click Here link in the Sign In box. You will see the New Member Sign Up page. 3. Enter your [employer] Access Code exactly as it appears below. You will not need to use this code after youve completed the sign-up process. If you do not sign up before the expiration date, you must request a new code. · [employer] Access Code: [email or web address] Expires: 7/2/2018  6:08 PM 
 
4. Enter the last four digits of your Social Security Number (xxxx) and Date of Birth (mm/dd/yyyy) as indicated and click Submit. You will be taken to the next sign-up page. 5. Create a [employer] ID. This will be your [employer] login ID and cannot be changed, so think of one that is secure and easy to remember. 6. Create a [employer] password. You can change your password at any time. 7. Enter your Password Reset Question and Answer. This can be used at a later time if you forget your password. 8. Enter your e-mail address. You will receive e-mail notification when new information is available in [address] 9. Click Sign Up. You can now view and download portions of your medical record. 10. Click the Download Summary menu link to download a portable copy of your medical information. If you have questions, please visit the Frequently Asked Questions section of the [employer] website. Remember, [employer] is NOT to be used for urgent needs. For medical emergencies, dial 911. Now available from your iPhone and Android! Please provide this summary of care documentation to your next provider. Your primary care clinician is listed as Belem Dolan. If you have any questions after today's visit, please call [phone].

## 2018-04-03 NOTE — PROGRESS NOTES
Chief Complaint   Patient presents with    Foot Pain     both feet hurt all day, not able to stand or walk first thng inthe morning and tingles.

## 2018-04-03 NOTE — PROGRESS NOTES
Subjective:      Billy Morales is a 38 y.o. male who presents for bilateral foot pain. Patient reports that the pain has been going on intermittently for years. It is the worse when he steps out of bed in the morning. It is dull like in quality and located over his medial heel. Currently his pain is 8/10, at its worse it is 10/10. Denies any trauma of falls. Denies any radiation of the pain. He has tried a course of Gabapentin with no relief. He also used insoles and gone for PT with minimal relief. He did go to the  ED in November (four months ago) for the same complaint. XR obtained at that point showed no abnormalities. He has not noticed any alleviating or aggravating factors. Review of Systems   Constitutional: Negative for chills and fever. Cardiovascular: Negative for chest pain and leg swelling. Musculoskeletal: Negative for falls and joint pain. Positive for heel pain    Skin: Negative for itching and rash. Neurological: Negative for sensory change and focal weakness. PMHx:  No past medical history on file. Meds:   Current Outpatient Prescriptions   Medication Sig Dispense Refill    diclofenac EC (VOLTAREN) 75 mg EC tablet Take 1 Tab by mouth two (2) times a day. 20 Tab 0    gabapentin (NEURONTIN) 100 mg capsule Take 1 Cap by mouth three (3) times daily. 90 Cap 0    HYDROcodone-acetaminophen (LORTAB) 5-500 mg per tablet Take 1 Tab by mouth every four (4) hours as needed for Pain.  40 Tab 0       Allergies:   No Known Allergies    Smoker:  History   Smoking Status    Former Smoker   Smokeless Tobacco    Never Used       ETOH:   History   Alcohol Use    0.6 oz/week    1 Cans of beer per week     Comment: social, 1 beer / 2 months       FH:   Family History   Problem Relation Age of Onset    Diabetes Mother     Hypertension Mother     Hypertension Father     No Known Problems Sister     No Known Problems Brother          Objective:     Visit Vitals    BP 127/84 (BP 1 Location: Left arm, BP Patient Position: Sitting)    Pulse 77    Temp 98 °F (36.7 °C) (Oral)    Resp 18    Ht 6' 1\" (1.854 m)    Wt 239 lb (108.4 kg)    SpO2 98%    BMI 31.53 kg/m2     Physical Exam   Constitutional: He is oriented to person, place, and time and well-developed, well-nourished, and in no distress. No distress. Cardiovascular: Normal rate, regular rhythm and normal heart sounds. Pulmonary/Chest: Effort normal and breath sounds normal. No respiratory distress. He has no wheezes. Abdominal: Soft. Bowel sounds are normal. He exhibits no distension. There is no tenderness. Musculoskeletal:   3+ DP pulses bilateral, No edema, erythema or deformity noted. Intact to sensation. 5/5 muscle strength. Tender to palpation over medial heel, L>R. Neurological: He is alert and oriented to person, place, and time. Skin: He is not diaphoretic. Assessment:       ICD-10-CM ICD-9-CM    1. Plantar fasciitis, bilateral M72.2 728.71    2.  Pain in both feet M79.671 729.5 diclofenac EC (VOLTAREN) 75 mg EC tablet    M79.672           Plan:     Presentation consistent with plantar fascitis   Provided list of stretching exercises   Trial of NSAID therapy   Trial of silicone heel pads   If not improvement with above regimen consider referral to Podiatry for potential steroid injections                     Signed By:  Darren Cox MD    Family Medicine Resident

## 2018-04-04 ENCOUNTER — TELEPHONE (OUTPATIENT)
Dept: FAMILY MEDICINE CLINIC | Age: 43
End: 2018-04-04

## 2018-04-04 NOTE — TELEPHONE ENCOUNTER
Per call from patient, he states Dr. Francisco Reynoso referred him to [facility]. Patient states they don't accept care card and stated Dr. Francisco Reynoso told him to call office back if that happened to refer somewhere else. Also no referral order on file.     Pt ph [phone]

## 2018-04-06 NOTE — TELEPHONE ENCOUNTER
Spoke with patient today & informed him that we have no podiatrist that accept the [place]. ... Gave patient the number to our physician referral services to see if they can help him locate a podiatry that will work with him. ...

## 2018-04-23 ENCOUNTER — OFFICE VISIT (OUTPATIENT)
Dept: FAMILY MEDICINE CLINIC | Age: 43
End: 2018-04-23

## 2018-04-23 VITALS
HEART RATE: 60 BPM | BODY MASS INDEX: 31.68 KG/M2 | OXYGEN SATURATION: 98 % | TEMPERATURE: 98.8 F | DIASTOLIC BLOOD PRESSURE: 71 MMHG | WEIGHT: 239 LBS | SYSTOLIC BLOOD PRESSURE: 112 MMHG | HEIGHT: 73 IN

## 2018-04-23 DIAGNOSIS — M79.671 PAIN IN BOTH FEET: Primary | ICD-10-CM

## 2018-04-23 DIAGNOSIS — M79.672 PAIN IN BOTH FEET: Primary | ICD-10-CM

## 2018-04-23 RX ORDER — GABAPENTIN 300 MG/1
300 CAPSULE ORAL 3 TIMES DAILY
Qty: 90 CAP | Refills: 0 | Status: SHIPPED | OUTPATIENT
Start: 2018-04-23 | End: 2018-06-15 | Stop reason: SDUPTHER

## 2018-04-23 NOTE — PROGRESS NOTES
Subjective  Jersey Arce is an 38 y.o. male who presents for   Chief Complaint   Patient presents with    Foot Pain     follow up     F/u for foot pain. Last seen by me in July 2017 for b/l foot pain. Concern at that time for neuropathy; started on gabapentin 100 mg TID and advised to RTC in a few months. However patient had issues with Care Card so did not come back until today. Continues to report long-standing b/l foot pain; at least 5 years of seeing multiple doctors without clear results. States that he saw a podiatrist and was referred to physical therapy but did not benefit from treatment. Reports hot, burning, tingling pain in both feet, mostly in lateral and medial sides of feet. Pain upon waking, sometimes sharp, other times mild. Soaks feet in ice water in the morning for relief. Also reports cramping in feet and Achilles. States that at times even the weight of a blanket will cause pain. Denies any swelling. Reports weakness in the first 3-4 hours in the morning over the past few weeks. Allergies - reviewed:   No Known Allergies      Medications - reviewed:   Current Outpatient Prescriptions   Medication Sig    gabapentin (NEURONTIN) 300 mg capsule Take 1 Cap by mouth three (3) times daily.  diclofenac EC (VOLTAREN) 75 mg EC tablet Take 1 Tab by mouth two (2) times a day.  HYDROcodone-acetaminophen (LORTAB) 5-500 mg per tablet Take 1 Tab by mouth every four (4) hours as needed for Pain. No current facility-administered medications for this visit. Past Medical History - reviewed:  History reviewed. No pertinent past medical history.       Past Surgical History - reviewed:   Past Surgical History:   Procedure Laterality Date    HX CHOLECYSTECTOMY      HX ORTHOPAEDIC      right hand surgery 12  years ago         Social History - reviewed:  Social History     Social History    Marital status:      Spouse name: N/A    Number of children: N/A    Years of education: N/A     Occupational History    Not on file. Social History Main Topics    Smoking status: Former Smoker    Smokeless tobacco: Never Used    Alcohol use 0.6 oz/week     1 Cans of beer per week      Comment: social, 1 beer / 2 months    Drug use: No    Sexual activity: Yes     Partners: Female     Other Topics Concern    Not on file     Social History Narrative         Family History - reviewed:  Family History   Problem Relation Age of Onset    Diabetes Mother     Hypertension Mother     Hypertension Father     No Known Problems Sister     No Known Problems Brother          Immunizations - reviewed: There is no immunization history on file for this patient. ROS  CONSTITUTIONAL: weakness, Denies: fever  NEURO: numbness/tingling  MUSCULOSKELETAL: b/l foot pain      Physical Exam  Visit Vitals    /71    Pulse 60    Temp 98.8 °F (37.1 °C) (Oral)    Ht 6' 1\" (1.854 m)    Wt 239 lb (108.4 kg)    SpO2 98%    BMI 31.53 kg/m2       General appearance - alert, well appearing, and in no distress  Chest - clear to auscultation, no wheezes, rales or rhonchi, symmetric air entry  Heart - normal rate, regular rhythm, normal S1, S2, no murmurs, rubs, clicks or gallops  Neurological - alert, oriented, normal speech, no focal findings or movement disorder noted  Musculoskeletal -   Foot exam:  R foot: no TTP and full ROM. Preserved sensation  L foot: mild diffuse TTP over entire lateral foot. No calcaneal or plantar foot pain. Full ROM. Extremities - peripheral pulses normal, no pedal edema, no clubbing or cyanosis  Skin - normal coloration and turgor, no rashes, no suspicious skin lesions noted      Assessment/Plan    ICD-10-CM ICD-9-CM    1. Pain in both feet M79.671 729.5 REFERRAL TO NEUROLOGY    M79.672  gabapentin (NEURONTIN) 300 mg capsule     Patient with long-standing neuropathic b/l foot pain.  During previous work-up at this clinic, lab work was normal including B12, A1c, LFT's, electrolytes. Previous XR of feet 11/2017 without abnormality. Failed trial of very small dose of gabapentin. - Refer to neurology for EMG due to concern for possible tarsal tunnel (medial ankle pain) vs polyneuropathy  - Increase gabapentin to 300 mg TID  - If neuro work-up does not result in definitive diagnosis, would consider uric acid, labs for inflammatory myopathies (CK, ESR/CRP, SONG, anti-Jocelyne-1) and referral to rheumatology      Follow-up Disposition: Not on File      I have discussed the diagnosis with the patient and the intended plan as seen in the above orders. The patient has received an after-visit summary and questions were answered concerning future plans. I have discussed medication side effects and warnings with the patient as well. Pramod Moran MD  Family Medicine Resident    Patient discussed with Dr. Washington Elmore, attending physician.

## 2018-04-23 NOTE — PROGRESS NOTES
Chief Complaint   Patient presents with    Foot Pain     follow up     1. Have you been to the ER, urgent care clinic since your last visit? Hospitalized since your last visit? No    2. Have you seen or consulted any other health care providers outside of the [address] [place] [address] since your last visit? Include any pap smears or colon screening.  No

## 2018-04-23 NOTE — MR AVS SNAPSHOT
[address] [phone] Patient: Vick Guerrero MRN: [id number] :1975 Visit Information Date & Time Provider Department Dept. Phone Encounter #  
 2018  3:50 PM Dory Mckoy MD [facility] [phone] [id number] Upcoming Health Maintenance Date Due DTaP/Tdap/Td series (1 - Tdap) 1998 Influenza Age 5 to Adult 2017 Allergies as of 2018  Review Complete On: 2018 By: Adrian Martinez LPN No Known Allergies Current Immunizations  Never Reviewed No immunizations on file. Not reviewed this visit You Were Diagnosed With   
  
 Codes Comments Pain in both feet    -  Primary ICD-10-CM: M79.671, V99.091 ICD-9-CM: 729.5 Vitals BP Pulse Temp Height(growth percentile) Weight(growth percentile) SpO2  
 112/71 60 98.8 °F (37.1 °C) (Oral) 6' 1\" (1.854 m) 239 lb (108.4 kg) 98% BMI Smoking Status 31.53 kg/m2 Former Smoker Vitals History BMI and BSA Data Body Mass Index Body Surface Area  
 31.53 kg/m 2 2.36 m 2 Preferred Pharmacy Pharmacy Name Phone 500 [address] [phone] Your Updated Medication List  
  
   
This list is accurate as of 18  4:34 PM.  Always use your most recent med list.  
  
  
  
  
 diclofenac EC 75 mg EC tablet Commonly known as:  VOLTAREN Take 1 Tab by mouth two (2) times a day.  
  
 gabapentin 300 mg capsule Commonly known as:  NEURONTIN Take 1 Cap by mouth three (3) times daily. HYDROcodone-acetaminophen 5-500 mg per tablet Commonly known as:  Belynda Scarce Take 1 Tab by mouth every four (4) hours as needed for Pain. Prescriptions Sent to Pharmacy Refills  
 gabapentin (NEURONTIN) 300 mg capsule 0 Sig: Take 1 Cap by mouth three (3) times daily.   
 Class: Normal  
 Pharmacy: [address] [place] [address]   #: [phone] Route: Oral  
  
We Performed the Following REFERRAL TO NEUROLOGY [JGG28 Custom] Comments:  
 Neuropathic foot pain b/l Referral Information Referral ID Referred By Referred To  
  
 [id number] [facility], [facility] MD Roderick Valenzuela [address] [place], [address] [place] [address] Phone: [phone] Fax: [phone] Visits Status Start Date End Date 1 New Request 4/23/18 4/23/19 If your referral has a status of pending review or denied, additional information will be sent to support the outcome of this decision. Introducing [facility]! Vishal Ren introduces [employer] patient portal. Now you can access parts of your medical record, email your doctor's office, and request medication refills online. 1. In your internet browser, go to [email or web address] 2. Click on the First Time User? Click Here link in the Sign In box. You will see the New Member Sign Up page. 3. Enter your [employer] Access Code exactly as it appears below. You will not need to use this code after youve completed the sign-up process. If you do not sign up before the expiration date, you must request a new code. · [employer] Access Code: [email or web address] Expires: 7/2/2018  6:08 PM 
 
4. Enter the last four digits of your Social Security Number (xxxx) and Date of Birth (mm/dd/yyyy) as indicated and click Submit. You will be taken to the next sign-up page. 5. Create a [employer] ID. This will be your [employer] login ID and cannot be changed, so think of one that is secure and easy to remember. 6. Create a [employer] password. You can change your password at any time. 7. Enter your Password Reset Question and Answer. This can be used at a later time if you forget your password. 8. Enter your e-mail address.  You will receive e-mail notification when new information is available in [employer]. 9. Click Sign Up. You can now view and download portions of your medical record. 10. Click the Download Summary menu link to download a portable copy of your medical information. If you have questions, please visit the Frequently Asked Questions section of the [employer] website. Remember, [employer] is NOT to be used for urgent needs. For medical emergencies, dial 911. Now available from your iPhone and Android! Please provide this summary of care documentation to your next provider. Your primary care clinician is listed as Devon Rahman. If you have any questions after today's visit, please call [phone].

## 2018-04-23 NOTE — LETTER
NOTIFICATION RETURN TO WORK / SCHOOL 
 
4/23/2018 4:39 PM 
 
Mr. Sukhdev García. [address] [address] To Whom It May Concern: 
 
Sukhdev Figueredo is currently under the care of [facility]. He will return to work on Tuesday April 23,2018. If there are questions or concerns please have the patient contact our office.  
 
 
 
Sincerely, 
 
 
Katerina Haq MD

## 2018-04-26 ENCOUNTER — TELEPHONE (OUTPATIENT)
Dept: FAMILY MEDICINE CLINIC | Age: 43
End: 2018-04-26

## 2018-04-30 ENCOUNTER — OFFICE VISIT (OUTPATIENT)
Dept: NEUROLOGY | Age: 43
End: 2018-04-30

## 2018-04-30 VITALS — SYSTOLIC BLOOD PRESSURE: 134 MMHG | DIASTOLIC BLOOD PRESSURE: 80 MMHG | OXYGEN SATURATION: 99 % | HEART RATE: 67 BPM

## 2018-04-30 DIAGNOSIS — R20.2 PARESTHESIA: Primary | ICD-10-CM

## 2018-04-30 NOTE — PROGRESS NOTES
NEUROLOGY NEW PATIENT OFFICE CONSULTATION      4/30/2018    RE: Kathie Valenzuela         1975      REFERRED BY:  Lincoln Castro MD        CHIEF COMPLAINT:  This is Kathie Valenzuela is a 38 y.o. male right handed works in [employer] who had no chief complaint listed for this encounter. HPI:     For the past 5 yrs, patient noted, pain in bottom of both feet, described as burning and sharp, 7/10, not related to certain activity, not relieved with rest, symmetric,     (-) weakness  (+) tingling numbness  (-) lower back pain  (-) incontinence  (-) erectile dysfunction  (-) lightheadedness  (+) decreased feet sweating    Patient saw podiatrist where X-ray was fine and diagnosed with plantar fasciitis, physical therapy and in-sole did not help, not tried injections. Currently feels like symptoms are getting worse. Patient saw PCP and placed on Gabapentin 300 mg TID with some benefit    ROS   (-) fever  (-) rash  All other systems reviewed and are negative    Past Medical Hx  Past Medical History:   Diagnosis Date    Arthritis    Cholecystectomy    Social Hx  Social History     Social History    Marital status:      Spouse name: N/A    Number of children: N/A    Years of education: N/A     Social History Main Topics    Smoking status: Former Smoker    Smokeless tobacco: Never Used    Alcohol use 0.6 oz/week     1 Cans of beer per week      Comment: social, 1 beer / 2 months    Drug use: No    Sexual activity: Yes     Partners: Female     Other Topics Concern    None     Social History Narrative       Family Hx  Family History   Problem Relation Age of Onset    Diabetes Mother     Hypertension Mother     Hypertension Father     No Known Problems Sister     No Known Problems Brother        ALLERGIES  No Known Allergies    CURRENT MEDS  Current Outpatient Prescriptions   Medication Sig Dispense Refill    gabapentin (NEURONTIN) 300 mg capsule Take 1 Cap by mouth three (3) times daily.  90 Cap 0  diclofenac EC (VOLTAREN) 75 mg EC tablet Take 1 Tab by mouth two (2) times a day. 20 Tab 0    HYDROcodone-acetaminophen (LORTAB) 5-500 mg per tablet Take 1 Tab by mouth every four (4) hours as needed for Pain. 40 Tab 0           PREVIOUS WORKUP: (reviewed)  IMAGING:    CT Results (recent):    Results from Hospital Encounter encounter on 06/08/11   CT ABDOMEN PELVIS WITHOUT CONTRAST   Narrative **Final Report**      ICD Codes / Adm. Diagnosis: 887621   / Abdominal Pain    Examination:  CT ABD PELVIS  CON  - [id number] - Jun 8 2011 11:41AM  Accession No:  [id number]  Reason:  rlq abd pain      REPORT:  INDICATION: Right lower quadrant pain. Exam: Noncontrast CT of the abdomen and pelvis is performed with 5 mm   collimation. Coronal reformatted images were also performed. There is no prior study for direct comparison. FINDINGS:    The visualized lung bases are clear. Abdomen: There is mild right hydronephrosis and proximal right hydroureter   to the level of the right midureter where there is a 5 mm calculus. Gallstones are noted in the gallbladder; there is no CT evidence of acute   cholecystitis. The liver, spleen, adrenals, pancreas are normal on   noncontrast images. No thickened or dilated loop of large or small bowel   seen. There is no free intraperitoneal gas or fluid. Pelvis: Urinary bladder is partially filled and grossly normal. The appendix   is normal. There is no free fluid in the pelvis. IMPRESSION:   1. Mild right hydronephrosis and proximal right hydroureter to the level of   the right midureter where there is a 5 mm calculus. 2. Normal appendix. 3. Cholelithiasis. Signing/Reading Doctor: ELIER OLEA ([id number])  Transcribed: n/a on 06/08/2011  Approved: ELIER Mcdonnell ([id number])  06/08/2011          Distribution:  Attending Doctor: Leann Mayorga              MRI Results (recent):  No results found for this or any previous visit.     IR Results (recent):  No results found for this or any previous visit. VAS/US Results (recent):    Results from Hospital Encounter encounter on 07/03/13   DUPLEX LOWER EXT VENOUS LEFT        LABS (reviewed)  Results for orders placed or performed during the hospital encounter of 11/28/17   POC CHEM8   Result Value Ref Range    Calcium, ionized (POC) 1.22 1.12 - 1.32 MMOL/L    Sodium (POC) 143 136 - 145 MMOL/L    Potassium (POC) 4.3 3.5 - 5.1 MMOL/L    Chloride (POC) 104 98 - 107 MMOL/L    CO2 (POC) 27 21 - 32 MMOL/L    Anion gap (POC) 17 (H) 5 - 15 mmol/L    Glucose (POC) 95 65 - 100 MG/DL    BUN (POC) 22 (H) 9 - 20 MG/DL    Creatinine (POC) 1.0 0.6 - 1.3 MG/DL    GFRAA, POC >60 >60 ml/min/1.73m2    GFRNA, POC >60 >60 ml/min/1.73m2    Hemoglobin (POC) 14.3 12.1 - 17.0 GM/DL    Hematocrit (POC) 42 36.6 - 50.3 %    Comment Comment Not Indicated. Physical Exam:     Visit Vitals    /80    Pulse 67    SpO2 99%     General:  Alert, cooperative, no distress. Head:  Normocephalic, without obvious abnormality, atraumatic. Eyes:  Conjunctivae/corneas clear. Lungs:  Heart:   Non labored breathing  Regular rate and rhythm, no carotid bruits   Abdomen:   Soft, non-distended   Extremities: Extremities normal, atraumatic, no cyanosis or edema. Pulses: 2+ and symmetric all extremities. Skin: Skin color, texture, turgor normal. No rashes or lesions.   Neurologic Exam     Gen: Attention normal             Language: naming, repetition, fluency normal             Memory: intact recent and remote memory  Cranial Nerves:  I: smell Not tested   II: visual fields Full to confrontation   II: pupils Equal, round, reactive to light   II: optic disc No papilledema   III,VII: ptosis none   III,IV,VI: extraocular muscles  Full ROM   V: mastication normal   V: facial light touch sensation  normal   VII: facial muscle function   symmetric   VIII: hearing symmetric   IX: soft palate elevation  normal   XI: trapezius strength  5/5   XI: sternocleidomastoid strength 5/5   XI: neck flexion strength  5/5   XII: tongue  midline     Motor: normal bulk and tone, no tremor              Strength: 5/5 all four extremities  Sensory: intact to LT, PP, vibration, and JPS  Reflexes: 2+ throughout; Down going toes  Coordination: Good FTN and HTS  Gait: normal gait including tandem            Impression:     Adebayo Wills is a 38 y.o. male who  has a past medical history of Arthritis. who for the past 5 yrs, noted tingling numbness and pain in bottom of both feet, described as burning and sharp, 7/10, not related to certain activity, not relieved with rest, symmetric, associated with decreased feet sweating. Patient saw podiatrist where X-ray was fine and diagnosed with plantar fasciitis, physical therapy and in-sole did not help, not tried injections. Patient saw PCP and placed on Gabapentin 300 mg TID with some benefit. Consideration includes small fiber neuropathy. Patient should be evaluated for metabolic nutritional and inflammatory causes of his symptoms. RECOMMENDATIONS  1. I had a long discussion with patient and wife. Discussed diagnosis, prognosis, pathophysiology and available treatment. All questions were answered. 2. Blood test for 2 hr OGTT, TSH, Vit B12, Folate, SPEP/HEATHER, ACE, Anti Gliadin Ab, ESR, SONG, Vit B6  3. EMG/NCS of both LE with medial plantar studies  4. Trial of Alpha lipoic acid 600 mg every day   5. Already on Gabapentin 300 mg TID c/o PCP. Optimize dose as tolerated  6.  Depending on above, will consider QSWEAT and IENF skin biopsy and pain cream      Follow-up Disposition: Not on File      Thank you for the consultation      Gifty Archuleta MD  Diplomate, American Board of Psychiatry and Neurology  Diplomate, Neuromuscular Medicine  Diplomate, American Board of Electrodiagnostic Medicine        CC: Fay Carreno MD  Fax: [phone]

## 2018-04-30 NOTE — LETTER
4/30/2018 4:03 PM 
 
Patient:  Pierce Carlos YOB: 1975 Date of Visit: 4/30/2018 Dear MD [id number] [facility] [place] [address] VIA In Basket 
 : Thank you for referring Mr. Pierce Carlos to me for evaluation/treatment. Below are the relevant portions of my assessment and plan of care. If you have questions, please do not hesitate to call me. I look forward to following Mr. Ozzie Gonzalez along with you. Sincerely, Olinda Haq MD

## 2018-04-30 NOTE — PATIENT INSTRUCTIONS
[facility]   Statement to Patients  April 1, 2014      In an effort to ensure the large volume of patient prescription refills is processed in the most efficient and expeditious manner, we are asking our patients to assist us by calling your Pharmacy for all prescription refills, this will include also your  Mail Order Pharmacy. The pharmacy will contact our office electronically to continue the refill process. Please do not wait until the last minute to call your pharmacy. We need at least 48 hours (2days) to fill prescriptions. We also encourage you to call your pharmacy before going to  your prescription to make sure it is ready. With regard to controlled substance prescription refill requests (narcotic refills) that need to be picked up at our office, we ask your cooperation by providing us with at least 72 hours (3days) notice that you will need a refill. We will not refill narcotic prescription refill requests after 4:00pm on any weekday, Monday through Thursday, or after 2:00pm on Fridays, or on the weekends. We encourage everyone to explore another way of getting your prescription refill request processed using [employer], our patient web portal through our electronic medical record system. [employer] is an efficient and effective way to communicate your medication request directly to the office and  downloadable as an abi on your smart phone . [employer] also features a review functionality that allows you to view your medication list as well as leave messages for your physician. Are you ready to get connected? If so please review the attatched instructions or speak to any of our staff to get you set up right away! Thank you so much for your cooperation. Should you have any questions please contact our Practice Administrator.     The Physicians and Staff,  [facility]

## 2018-05-08 ENCOUNTER — OFFICE VISIT (OUTPATIENT)
Dept: FAMILY MEDICINE CLINIC | Age: 43
End: 2018-05-08

## 2018-05-08 VITALS
DIASTOLIC BLOOD PRESSURE: 80 MMHG | BODY MASS INDEX: 31.81 KG/M2 | TEMPERATURE: 98.7 F | HEART RATE: 60 BPM | RESPIRATION RATE: 16 BRPM | WEIGHT: 240 LBS | OXYGEN SATURATION: 97 % | SYSTOLIC BLOOD PRESSURE: 120 MMHG | HEIGHT: 73 IN

## 2018-05-08 DIAGNOSIS — M79.672 BILATERAL FOOT PAIN: Primary | ICD-10-CM

## 2018-05-08 DIAGNOSIS — M79.671 BILATERAL FOOT PAIN: Primary | ICD-10-CM

## 2018-05-08 NOTE — MR AVS SNAPSHOT
[address] 
[phone] Patient: Deanna Arreola MRN: [id number] :1975 Visit Information Date & Time Provider Department Dept. Phone Encounter #  
 2018  2:50 PM Rishi Vale, Alie Valdez Souza [phone] [id number] Your Appointments 2018  3:00 PM  
PROCEDURE with EMG SCHEDULE  [facility] ([facility]) Appt Note: EMG; BLE poss. polyneuropathy,asif. medial plantar. [place]  [facility] [address] [facility] [id number] [phone]  
  
   
 [place]  [address] [address] [address] Upcoming Health Maintenance Date Due DTaP/Tdap/Td series (1 - Tdap) 1998 Influenza Age 5 to Adult 2018 Allergies as of 2018  Review Complete On: 2018 By: Todd Soulier No Known Allergies Current Immunizations  Never Reviewed No immunizations on file. Not reviewed this visit You Were Diagnosed With   
  
 Codes Comments Bilateral foot pain    -  Primary ICD-10-CM: M79.671, X69.803 ICD-9-CM: 729.5 Vitals BP Pulse Temp Resp Height(growth percentile) Weight(growth percentile) 120/80 60 98.7 °F (37.1 °C) 16 6' 1\" (1.854 m) 240 lb (108.9 kg) SpO2 BMI Smoking Status 97% 31.66 kg/m2 Former Smoker BMI and BSA Data Body Mass Index Body Surface Area  
 31.66 kg/m 2 2.37 m 2 [facility] Pharmacy Name Phone [address], [address] [place] [address] [phone] Your Updated Medication List  
  
   
This list is accurate as of 18  3:19 PM.  Always use your most recent med list.  
  
  
  
  
 gabapentin 300 mg capsule Commonly known as:  NEURONTIN Take 1 Cap by mouth three (3) times daily. We Performed the Following SONG, DIRECT, W/REFLEX N3845973 CPT(R)] ANGIOTENSIN CONVERTING ENZYME C6623639 CPT(R)] GLIADIN ABS, IGA AND IGG [[id number] Custom] SED RATE (ESR) C1309626 CPT(R)] TSH 3RD GENERATION [69469 CPT(R)] VITAMIN B12 & FOLATE [19584 CPT(R)] VITAMIN B6 U8979949 CPT(R)] To-Do List   
 05/08/2018 Lab:  GAMMOPATHY EVAL, SPEP/HEATHER, IG QT/FLC   
  
 05/08/2018 Lab:  GLUCOSE, 2 HR, PP GLUCOLA Introducing [address]! [facility] introduce portal paciente MyChart . Ahora se puede acceder a partes de enriquez expediente médico, enviar por correo electrónico la oficina de enriquez médico y solicitar renovaciones de medicamentos en línea. En enriquez navegador de Internet , Jami up [email or web address] Jasmyn clic en el usuario por Vane Roney? Harman Rough clic aquí en la sesión Elray Alla. Verá la página de registro Iselin. Ingrese enriquez código de [employer] doyle y zora aparece a continuación. Usted no tendrá que [employer] código después de adrianna completado el proceso de registro . Si usted no se inscribe antes de la fecha de caducidad , debe solicitar un nuevo código. · MyChart Código de acceso : [email or web address] Expires: 7/2/2018  6:08 PM 
 
Ingresa los últimos cuatro dígitos de enriquez Número de Seguro Social ( xxxx ) y fecha de nacimiento ( dd / mm / aaaa ) zora se indica y jasmyn clic en Enviar. Usted será llevado a la siguiente página de registro . Crear un ID MyChart . Esta será enriquez ID de inicio de sesión de MyChart y no puede ser [place] , por lo que pensar en geo que es [place] y fácil de recordar . Crear geo contraseña MyChart . Usted puede cambiar enriquez contraseña en cualquier momento . Ingrese enriquez Password Reset de preguntas y [place] . [place] se puede utilizar en un momento posterior si usted olvida enriquez contraseña. Introduzca enriquez dirección de correo electrónico . Malena Pierce recibirá geo notificación por correo electrónico cuando la nueva información está disponible en MyChart . Artem Bue clic en Registrarse. Jane Hampton kaleb y descargar porciones de enriquez expediente médico. 
Jaren clic en el enlace de descarga del menú Resumen para descargar geo copia portátil de enriquez información médica . Si tiene [employer] , por favor visite la sección de preguntas frecuentes del sitio web MyChart . Recuerde, MyChart NO es que se utilizará para las necesidades urgentes. Para emergencias médicas , llame al 911 . Ahora disponible en enriquez iPhone y Android ! Por favor proporcione zainab resumen de la documentación de cuidado a enriquez próximo proveedor. Your primary care clinician is listed as Dory Mckoy. If you have any questions after today's visit, please call [phone].

## 2018-05-08 NOTE — PROGRESS NOTES
Chief Complaint   Patient presents with    Follow-up     Pt is being seen to follow-up from appt with foot doctor.  Pt has lab order from doctor

## 2018-05-08 NOTE — PROGRESS NOTES
Subjective  Vanessa Henderson is an 38 y.o. male who presents for f/u foot pain. Was sent at last visit to neurology for chronic b/l foot pain. Increased gabapentin from 100 to 300 mg TID. Plan from neurology to do labs, EMG. Wondering if he can get those labs drawn today. Reports 20% improvement in pain with gabapentin. Allergies - reviewed:   No Known Allergies    Medications - reviewed:   Current Outpatient Prescriptions   Medication Sig    gabapentin (NEURONTIN) 300 mg capsule Take 1 Cap by mouth three (3) times daily. No current facility-administered medications for this visit. Past Medical History - reviewed:  Past Medical History:   Diagnosis Date    Arthritis        Past Surgical History - reviewed:   Past Surgical History:   Procedure Laterality Date    HX CHOLECYSTECTOMY      HX ORTHOPAEDIC      right hand surgery 12  years ago       ROS  NEURO: numbness/tingling  MUSCULOSKELETAL: b/l foot pain      Physical Exam  Visit Vitals    /80    Pulse 60    Temp 98.7 °F (37.1 °C)    Resp 16    Ht 6' 1\" (1.854 m)    Wt 240 lb (108.9 kg)    SpO2 97%    BMI 31.66 kg/m2       General appearance - alert, well appearing, and in no distress  Neurological - alert, oriented, normal speech, no focal findings or movement disorder noted  Skin - normal coloration and turgor, no rashes, no suspicious skin lesions noted      Assessment/Plan    ICD-10-CM ICD-9-CM    1. Bilateral foot pain M79.671 729.5 TSH 3RD GENERATION    M79.672  SONG, DIRECT, W/REFLEX      SED RATE (ESR)      VITAMIN B6      GLIADIN ABS, IGA AND IGG      VITAMIN B12 & FOLATE      ANGIOTENSIN CONVERTING ENZYME      GLUCOSE, 2 HR, PP GLUCOLA      GAMMOPATHY EVAL, SPEP/HEATHER, IG QT/FLC      CANCELED: GAMMOPATHY EVAL, SPEP/HEATHER, IG QT/FLC     Improvement noted with gabapentin 800 mg TID. Will continue current dose and also order labs -- will forward results to Dr. Moisés Wade for his review.  Patient to RTC for 2 hour oral glucose tolerance test.      Follow-up Disposition: Not on File      I have discussed the diagnosis with the patient and the intended plan as seen in the above orders. The patient has received an after-visit summary and questions were answered concerning future plans. I have discussed medication side effects and warnings with the patient as well. Clara Abraham MD  Family Medicine Resident    Patient discussed with Dr. Genny Chaudhry, attending physician.

## 2018-05-11 DIAGNOSIS — M79.671 BILATERAL FOOT PAIN: ICD-10-CM

## 2018-05-11 DIAGNOSIS — M79.672 BILATERAL FOOT PAIN: ICD-10-CM

## 2018-05-15 LAB
GLUCOSE 2H P 75 G GLC PO SERPL-MCNC: 69 MG/DL (ref 65–139)
GLUCOSE P FAST SERPL-MCNC: 86 MG/DL (ref 65–99)

## 2018-05-17 LAB
ACE SERPL-CCNC: < 15 U/L (ref 14–82)
ALBUMIN SERPL ELPH-MCNC: 4.2 G/DL (ref 2.9–4.4)
ALBUMIN/GLOB SERPL: 1.3 {RATIO} (ref 0.7–1.7)
ALPHA1 GLOB SERPL ELPH-MCNC: 0.2 G/DL (ref 0–0.4)
ALPHA2 GLOB SERPL ELPH-MCNC: 0.6 G/DL (ref 0.4–1)
ANA SER QL: NEGATIVE
B-GLOBULIN SERPL ELPH-MCNC: 1.1 G/DL (ref 0.7–1.3)
ERYTHROCYTE [SEDIMENTATION RATE] IN BLOOD BY WESTERGREN METHOD: 9 MM/HR (ref 0–15)
FOLATE SERPL-MCNC: >20 NG/ML
GAMMA GLOB SERPL ELPH-MCNC: 1.6 G/DL (ref 0.4–1.8)
GLIADIN PEPTIDE IGA SER-ACNC: 2 UNITS (ref 0–19)
GLIADIN PEPTIDE IGG SER-ACNC: 2 UNITS (ref 0–19)
GLOBULIN SER-MCNC: 3.4 G/DL (ref 2.2–3.9)
IGA SERPL-MCNC: 262 MG/DL (ref 90–386)
IGG SERPL-MCNC: 864 MG/DL (ref 700–1600)
IGM SERPL-MCNC: 121 MG/DL (ref 20–172)
INTERPRETATION SERPL IEP-IMP: NORMAL
KAPPA LC FREE SER-MCNC: 15 MG/L (ref 3.3–19.4)
KAPPA LC FREE/LAMBDA FREE SER: 0.79 {RATIO} (ref 0.26–1.65)
LAMBDA LC FREE SERPL-MCNC: 18.9 MG/L (ref 5.7–26.3)
M PROTEIN SERPL ELPH-MCNC: NORMAL G/DL
PLEASE NOTE:, 149534: NORMAL
PROT SERPL-MCNC: 7.6 G/DL (ref 6–8.5)
TSH SERPL DL<=0.005 MIU/L-ACNC: 1.38 UIU/ML (ref 0.45–4.5)
VIT B12 SERPL-MCNC: 618 PG/ML (ref 232–1245)
VIT B6 SERPL-MCNC: 33.7 UG/L (ref 5.3–46.7)

## 2018-05-24 ENCOUNTER — OFFICE VISIT (OUTPATIENT)
Dept: NEUROLOGY | Age: 43
End: 2018-05-24

## 2018-05-24 DIAGNOSIS — M79.672 PAIN IN BOTH FEET: Primary | ICD-10-CM

## 2018-05-24 DIAGNOSIS — M79.671 PAIN IN BOTH FEET: Primary | ICD-10-CM

## 2018-05-24 NOTE — PROGRESS NOTES
Neurology Progress Note    Patient ID:  Andria Paulino  [id number]  38 y.o.  1975      Subjective:   History:  Mr. Sharyle Kelly with *** previously seen by ***, treated with *** who is here for follow up today for ***.      ROS:  Per HPI-  Otherwise the remainder of ROS was negative    Social Hx  Social History     Social History    Marital status:      Spouse name: N/A    Number of children: N/A    Years of education: N/A     Social History Main Topics    Smoking status: Former Smoker    Smokeless tobacco: Never Used    Alcohol use 0.6 oz/week     1 Cans of beer per week      Comment: social, 1 beer / 2 months    Drug use: No    Sexual activity: Yes     Partners: Female     Other Topics Concern    Not on file     Social History Narrative       Meds:  Current Outpatient Prescriptions on File Prior to Visit   Medication Sig Dispense Refill    gabapentin (NEURONTIN) 300 mg capsule Take 1 Cap by mouth three (3) times daily. 90 Cap 0     No current facility-administered medications on file prior to visit. Imaging:    CT Results (recent):    Results from Hospital Encounter encounter on 06/08/11   CT ABDOMEN PELVIS WITHOUT CONTRAST   Narrative **Final Report**      ICD Codes / Adm. Diagnosis: 527797   / Abdominal Pain    Examination:  CT ABD PELVIS  CON  - [id number] - Jun 8 2011 11:41AM  Accession No:  [id number]  Reason:  rlq abd pain      REPORT:  INDICATION: Right lower quadrant pain. Exam: Noncontrast CT of the abdomen and pelvis is performed with 5 mm   collimation. Coronal reformatted images were also performed. There is no prior study for direct comparison. FINDINGS:    The visualized lung bases are clear. Abdomen: There is mild right hydronephrosis and proximal right hydroureter   to the level of the right midureter where there is a 5 mm calculus. Gallstones are noted in the gallbladder; there is no CT evidence of acute   cholecystitis.  The liver, spleen, adrenals, pancreas are normal on noncontrast images. No thickened or dilated loop of large or small bowel   seen. There is no free intraperitoneal gas or fluid. Pelvis: Urinary bladder is partially filled and grossly normal. The appendix   is normal. There is no free fluid in the pelvis. IMPRESSION:   1. Mild right hydronephrosis and proximal right hydroureter to the level of   the right midureter where there is a 5 mm calculus. 2. Normal appendix. 3. Cholelithiasis. Signing/Reading Doctor: ELIER OLEA ([id number])  Transcribed: n/a on 06/08/2011  Approved: ELIER Urrutia ([id number])  06/08/2011          Distribution:  Attending Doctor: Patrizia Mc              MRI Results (recent):  No results found for this or any previous visit. IR Results (recent):  No results found for this or any previous visit. VAS/US Results (recent):    Results from Hospital Encounter encounter on 07/03/13   DUPLEX LOWER EXT VENOUS LEFT    Reviewed records in [facility] and media tab today    Lab Review     Orders Only on 05/11/2018   Component Date Value Ref Range Status    Glucose 05/14/2018 86  65 - 99 mg/dL Final    Glucose, 2 hour 05/14/2018 69  65 - 139 mg/dL Final   Office Visit on 05/08/2018   Component Date Value Ref Range Status    TSH 05/08/2018 1.380  0.450 - 4.500 uIU/mL Final    Antinuclear Antibodies Direct 05/08/2018 Negative  Negative Final    Sed rate (ESR) 05/08/2018 9  0 - 15 mm/hr Final    Vitamin B6 05/08/2018 33.7  5.3 - 46.7 ug/L Final    Comment: This test was developed and its performance characteristics  determined by [facility]. It has not been cleared or approved  by the Food and Drug Administration.       Deamidated Gliadin Ab, IgA 05/08/2018 2  0 - 19 units Final    Comment:                    Negative                   0 - 19                     Weak Positive             20 - 30                     Moderate to Strong Positive   >30      Deamidated Gliadin Ab, IgG 05/08/2018 2  0 - 19 units Final    Comment: Negative                   0 - 19                     Weak Positive             20 - 30                     Moderate to Strong Positive   >30      Vitamin B12 05/08/2018 618  232 - 1245 pg/mL Final    Folate 05/08/2018 >20.0  >3.0 ng/mL Final    Comment: A serum folate concentration of less than 3.1 ng/mL is  considered to represent clinical deficiency.  Angiotensin Converting Enzyme (ACE) 05/08/2018 < 15  14 - 82 U/L Final    **Results verified by repeat testing**    Immunoglobulin G, Qt. 05/08/2018 864  700 - 1600 mg/dL Final    Immunoglobulin A, Qt. 05/08/2018 262  90 - 386 mg/dL Final    Immunoglobulin M, Qt. 05/08/2018 121  20 - 172 mg/dL Final    Protein, total 05/08/2018 7.6  6.0 - 8.5 g/dL Final    Albumin 05/08/2018 4.2  2.9 - 4.4 g/dL Final    Alpha-1-Globulin 05/08/2018 0.2  0.0 - 0.4 g/dL Final    Alpha-2-Globulin 05/08/2018 0.6  0.4 - 1.0 g/dL Final    Beta Globulin 05/08/2018 1.1  0.7 - 1.3 g/dL Final    Gamma Globulin 05/08/2018 1.6  0.4 - 1.8 g/dL Final    M-Sanjeev 05/08/2018 Not Observed  Not Observed g/dL Final    Globulin, total 05/08/2018 3.4  2.2 - 3.9 g/dL Final    A/G ratio 05/08/2018 1.3  0.7 - 1.7 Final    Immunofixation Result 05/08/2018 Comment   Final    An apparent normal immunofixation pattern.  Please note 05/08/2018 Comment   Final    Comment: Protein electrophoresis scan will follow via computer, mail, or   delivery.  Free Kappa Lt Chains, serum 05/08/2018 15.0  3.3 - 19.4 mg/L Final    Free Lambda Lt Chains, serum 05/08/2018 18.9  5.7 - 26.3 mg/L Final    Kappa/Lambda ratio, serum 05/08/2018 0.79  0.26 - 1.65 Final         Objective:       Exam:  There were no vitals taken for this visit. Gen: Awake, alert, follows commands  Appropriate appearance, normal speech. Oriented to all spheres. No visual field defect on confrontation exam.  Full eyes movement, with no nystagmus, no diplopia, no ptosis. Normal gag and swallow.   All remaining cranial nerves were normal  Motor function: 5/5 in all extremities  Sensory: intact to LT, PP and JPS  DTRs ++ in all extremities, (-) Babinski  Good FTN and HTS   Gait: Normal    Assessment:     {No Diagnosis Found}        Plan:   1.  2.  3.          Follow-up Disposition: Not on File          Jeannette Brown MD  Diplomate, American Board of Psychiatry and Neurology  Diplomate, Neuromuscular Medicine  Diplomate, American Board of Electrodiagnostic Medicine

## 2018-05-24 NOTE — PROCEDURES
EMG/ NCS Report   [facility]  Dylan Soni, [address] [place], [address]   Ph: [phone]   FAX: [phone]/ [phone]  Test Date:  2018    Patient: Gabby Patel : 1975 Physician: Melida Menendez MD   Sex: Male Height: ' \" Ref Phys: Taj Calvo MD   ID#: [id number] Weight:  lbs. Technician: Modesta Higgins     Patient History / Exam:    Patient is coming for neuropathy evaluation. Srinivas Gupta is a 38 y.o. male who  has a past medical history of Arthritis. who for the past 5 yrs, noted tingling numbness and pain in bottom of both feet, described as burning and sharp, 7/10, not related to certain activity, not relieved with rest, symmetric, associated with decreased feet sweating. Patient saw podiatrist where X-ray was fine and diagnosed with plantar fasciitis, physical therapy and in-sole did not help, not tried injections. Patient saw PCP and placed on Gabapentin 300 mg TID with some benefit. Exam: Patient awake, alert, follows commands, clear speech; hearing grossly intact; EOMI, (-) facial asymmetry, tongue midline; Motor: 5/5 in all extremities; Sensory: intact to LT, PP, JPS; DTRs ++; Good FTN and HTS; Gait: stable      EMG & NCV Findings:  Evaluation of the left Fibular motor and the right Fibular motor nerves showed normal distal onset latency (L4.1, R3.0 ms), normal amplitude (L3.0, R4.1 mV), normal conduction velocity (B Fib-Ankle, L51, R43 m/s), and normal conduction velocity (Poplt-B Fib, L63, R71 m/s). The left tibial motor and the right tibial motor nerves showed normal distal onset latency (L4.1, R4.9 ms), normal amplitude (L9.9, R15.2 mV), and normal conduction velocity (Knee-Ankle, L45, R47 m/s). The left Sup Fibular sensory and the right Sup Fibular sensory nerves showed normal distal peak latency (L2.4, R2.6 ms), normal amplitude (L22.1, R27.7 µV), and normal conduction velocity (Lower leg-Lat ankle, L56, R53 m/s). The left sural sensory and the right sural sensory nerves showed normal distal peak latency (L3.2, R3.0 ms) and normal amplitude (L15.8, R16.5 µV). All F Wave latencies were within normal limits. All F Wave left vs. right side latency differences were within normal limits. All H Reflex left vs. right side latency differences were within normal limits. All examined muscles (as indicated in the following table) showed no evidence of electrical instability. Impression:    Extensive electrodiagnostic examination of the right and left lower extremities, including bilateral medial plantar studies, is normal.    Specifically, there is no evidence of a peripheral neuropathy of the large-fiber type.           Shaun Noland MD  Diplomate, American Board of Psychiatry and Neurology  Diplomate, Neuromuscular Medicine  Diplomate, American Board of Electrodiagnostic Medicine  Director, [address] [place] [address] Accredited Laboratory with Exemplary Status        Nerve Conduction Studies  Anti Sensory Summary Table     Stim Site NR Peak (ms) Norm Peak (ms) P-T Amp (µV) Norm P-T Amp Site1 Site2 Dist (cm)   Left Sup Fibular Anti Sensory (Lat ankle)  32.6°C   Lower leg    2.4 <4.5 22.1 >5 Lower leg Lat ankle 10.0   Right Sup Fibular Anti Sensory (Lat ankle)  32.4°C   Lower leg    2.6 <4.5 27.7 >5 Lower leg Lat ankle 10.0   Left Sural Anti Sensory (Lat Mall)  33.7°C   Calf    3.2 <4.5 15.8 >4.0 Calf Lat Mall 14.0   Right Sural Anti Sensory (Lat Mall)  31.3°C   Calf    3.0 <4.5 16.5 >4.0 Calf Lat Mall 14.0     Ortho Sensory Summary Table     Stim Site NR Peak (ms) P-T Amp (µV) Site1 Site2 Dist (cm) Zach (m/s)   Left Medial Plantar Ortho Sensory (Med Malleolus)  32.1°C   Digit 1    3.7 16.4 Digit 1 Med Malleolus 11.0 30   Right Medial Plantar Ortho Sensory (Med Malleolus)  31.5°C   Digit 1    3.6 14.9 Digit 1 Med Malleolus 11.0 31     Motor Summary Table     Stim Site NR Onset (ms) Norm Onset (ms) O-P Amp (mV) Norm O-P Amp Amp (Prev) (%) Site1 Site2 Dist (cm) Zach (m/s) Norm Zach (m/s)   Left Fibular Motor (Ext Dig Brev)  31.2°C   Ankle    4.1 <6.5 3.0 >2.6 100.0 Ankle Ext Dig Brev 8.0     B Fib    10.9  2.9  96.7 B Fib Ankle 34.5 51 >38   Poplt    12.5  2.8  96.6 Poplt B Fib 10.0 63 >42   Right Fibular Motor (Ext Dig Brev)  31.3°C   Ankle    3.0 <6.5 4.1 >2.6 100.0 Ankle Ext Dig Brev 8.0     B Fib    10.9  4.2  102.4 B Fib Ankle 34.0 43 >38   Poplt    12.3  4.3  102.4 Poplt B Fib 10.0 71 >42   Left Tibial Motor (Abd Morel Brev)  32.4°C   Ankle    4.1 <6.1 9.9 >5.3 100.0 Ankle Abd Morel Brev 8.0     Knee    12.3  8.2  82.8 Knee Ankle 37.0 45 >39   Right Tibial Motor (Abd Morel Brev)  31.6°C   Ankle    4.9 <6.1 15.2 >5.3 100.0 Ankle Abd Morel Brev 8.0     Knee    12.8  9.8  64.5 Knee Ankle 37.0 47 >39     F Wave Studies     NR F-Lat (ms) Lat Norm (ms) L-R F-Lat (ms) L-R Lat Norm   Left Tibial (Mrkrs) (Abd Hallucis)  31.4°C      53.30 <56 2.26 <5.7   Right Tibial (Mrkrs) (Abd Hallucis)  30.9°C      51.03 <56 2.26 <5.7     H Reflex Studies     NR H-Lat (ms) L-R H-Lat (ms) L-R Lat Norm   Left Tibial (Gastroc)  30.6°C      33.07 0.27 <2.0   Right Tibial (Gastroc)  30.4°C      32.80 0.27 <2.0     EMG     Side Muscle Nerve Root Ins Act Fibs Psw Recrt Duration Amp Poly Comment   Left Ext Dig Brev Dp Br Peron L5, S1 Nml Nml Nml Nml Nml Nml Nml    Left AbdHallucis MedPlantar S1-2 Nml Nml Nml Nml Nml Nml Nml    Left AntTibialis Dp Br Peron L4-5 Nml Nml Nml Nml Nml Nml Nml    Left MedGastroc Tibial S1-2 Nml Nml Nml Nml Nml Nml Nml    Left VastusLat Femoral L2-4 Nml Nml Nml Nml Nml Nml Nml    Right Ext Dig Brev Dp Br Peron L5, S1 Nml Nml Nml Nml Nml Nml Nml    Right AbdHallucis MedPlantar S1-2 Nml Nml Nml Nml Nml Nml Nml    Right AntTibialis Dp Br Peron L4-5 Nml Nml Nml Nml Nml Nml Nml    Right MedGastroc Tibial S1-2 Nml Nml Nml Nml Nml Nml Nml    Right VastusLat Femoral L2-4 Nml Nml Nml Nml Nml Nml Nml    Right Lower Lumb Parasp Rami L5,S1 Nml Nml Nml Nml Nml Nml Nml    Right GluteusMed SupGluteal L4-S1 Nml Nml Nml Nml Nml Nml Nml                Nerve Conduction Studies  Anti Sensory Left/Right Comparison     Stim Site L Lat (ms) R Lat (ms) L-R Lat (ms) L Amp (µV) R Amp (µV) L-R Amp (%) Site1 Site2 L Zach (m/s) R Zach (m/s) L-R Zach (m/s)   Sup Fibular Anti Sensory (Lat ankle)  32.6°C   Lower leg 1.8 1.9 0.1 22.1 27.7 20.2 Lower leg Lat ankle 56 53 3   Sural Anti Sensory (Lat Mall)  33.7°C   Calf 2.5 2.5 0.0 15.8 16.5 4.2 Calf Lat Mall 56 56 0     Ortho Sensory Left/Right Comparison     Stim Site L Lat (ms) R Lat (ms) L-R Lat (ms) L Amp (µV) R Amp (µV) L-R Amp (%) Site1 Site2 L Zach (m/s) R Zach (m/s) L-R Zach (m/s)   Medial Plantar Ortho Sensory (Med Malleolus)  32.1°C   Digit 1 2.6 2.9 0.3 16.4 14.9 9.1 Digit 1 Med Malleolus 30 31 1     Motor Left/Right Comparison     Stim Site L Lat (ms) R Lat (ms) L-R Lat (ms) L Amp (mV) R Amp (mV) L-R Amp (%) Site1 Site2 L Zach (m/s) R Zach (m/s) L-R Zach (m/s)   Fibular Motor (Ext Dig Brev)  31.2°C   Ankle 4.1 3.0 1.1 3.0 4.1 26.8 Ankle Ext Dig Brev      B Fib 10.9 10.9 0.0 2.9 4.2 31.0 B Fib Ankle 51 43 8   Poplt 12.5 12.3 0.2 2.8 4.3 34.9 Poplt B Fib 63 71 8   Tibial Motor (Abd Morel Brev)  32.4°C   Ankle 4.1 4.9 0.8 9.9 15.2 34.9 Ankle Abd Morel Brev      Knee 12.3 12.8 0.5 8.2 9.8 16.3 Knee Ankle 45 47 2         Waveforms:

## 2018-05-24 NOTE — PROGRESS NOTES
EMG/NCS done. See Procedure Note for results. Reviewed blood tests results done. They are unremarkable    Discussed EMG/NCS of both LE, including medial plantar study,  is normal with no evidence of peripheral neuropathy.     A> Pain in bottom of feet; possible plantar fasciitis    P> 1) Advise to Follow up with podiatrist for possible injection of plantar fascia  2) Will consider QSWEAT if symptoms persist despite above      Melida Menendez MD

## 2018-06-07 ENCOUNTER — OFFICE VISIT (OUTPATIENT)
Dept: FAMILY MEDICINE CLINIC | Age: 43
End: 2018-06-07

## 2018-06-07 VITALS
WEIGHT: 237 LBS | SYSTOLIC BLOOD PRESSURE: 131 MMHG | HEART RATE: 62 BPM | RESPIRATION RATE: 16 BRPM | DIASTOLIC BLOOD PRESSURE: 84 MMHG | TEMPERATURE: 99.7 F | BODY MASS INDEX: 31.41 KG/M2 | OXYGEN SATURATION: 100 % | HEIGHT: 73 IN

## 2018-06-07 DIAGNOSIS — M76.829 POSTERIOR TIBIAL TENDON DYSFUNCTION: ICD-10-CM

## 2018-06-07 DIAGNOSIS — M21.42 PES PLANUS OF BOTH FEET: Primary | ICD-10-CM

## 2018-06-07 DIAGNOSIS — M21.41 PES PLANUS OF BOTH FEET: Primary | ICD-10-CM

## 2018-06-07 RX ORDER — MELOXICAM 7.5 MG/1
7.5 TABLET ORAL DAILY
Qty: 30 TAB | Refills: 0 | Status: SHIPPED | OUTPATIENT
Start: 2018-06-07 | End: 2019-04-12

## 2018-06-07 RX ORDER — DICLOFENAC SODIUM 50 MG/1
50 TABLET, DELAYED RELEASE ORAL 2 TIMES DAILY
Qty: 60 TAB | Refills: 0 | Status: SHIPPED | OUTPATIENT
Start: 2018-06-07 | End: 2018-06-07 | Stop reason: CLARIF

## 2018-06-07 NOTE — PROGRESS NOTES
Chief Complaint   Patient presents with    Follow-up     from Neurology;  Pt is currently still having pain

## 2018-06-07 NOTE — MR AVS SNAPSHOT
[address] 
[phone] Patient: Katy Pierce MRN: [id number] :1975 Visit Information Date & Time Provider Department Dept. Phone Encounter #  
 2018  3:20 PM Idalia Cervantes, [facility] [phone] [id number] Your Appointments 2018  3:30 PM  
ACUTE CARE with Navneet Enrique MD  
[facility] Appt Note: Follow up Foot pain [address] [facility] [facility]  
[phone]  
  
   
 [address] [facility] [phone] Upcoming Health Maintenance Date Due DTaP/Tdap/Td series (1 - Tdap) 1998 Influenza Age 5 to Adult 2018 Allergies as of 2018  Review Complete On: 2018 By: Idalia Cervantes MD  
 No Known Allergies Current Immunizations  Never Reviewed No immunizations on file. Not reviewed this visit You Were Diagnosed With   
  
 Codes Comments Pes planus of both feet    -  Primary ICD-10-CM: M21.41, M21.42 
ICD-9-CM: 951 Posterior tibial tendon dysfunction     ICD-10-CM: M21.40 ICD-9-CM: 195 Vitals BP Pulse Temp Resp Height(growth percentile) Weight(growth percentile) 131/84 62 99.7 °F (37.6 °C) 16 6' 1\" (1.854 m) 237 lb (107.5 kg) SpO2 BMI Smoking Status 100% 31.27 kg/m2 Former Smoker BMI and BSA Data Body Mass Index Body Surface Area  
 31.27 kg/m 2 2.35 m 2 [facility] Name Phone [address], [address] [place] [address] [phone] Your Updated Medication List  
  
   
This list is accurate as of 18  4:42 PM.  Always use your most recent med list.  
  
  
  
  
 gabapentin 300 mg capsule Commonly known as:  NEURONTIN Take 1 Cap by mouth three (3) times daily. meloxicam 7.5 mg tablet Commonly known as:  MOBIC Take 1 Tab by mouth daily. Recetas Enviado a la Aba Refills  
 meloxicam (MOBIC) 7.5 mg tablet 0 Sig: Take 1 Tab by mouth daily. Class: Normal  
 Pharmacy: [address] [place] [address] Dr Clinton #: [phone] Route: Oral  
  
Introducing [facility]! [facility] introduce portal paciente MyChart . Ahora se puede acceder a partes de enriquez expediente médico, enviar por correo electrónico la oficina de enriquez médico y solicitar renovaciones de medicamentos en línea. En enriquez navegador de Internet , Arpita Matute a [email or web address] [employer]. [place]/mychart Jasmyn clic en el usuario por Edith Butter? Ashli Cantrell clic aquí en la sesión [facility]. Verá la página de registro [place]. Ingrese enriquez código de [employer] doyle y zora aparece a continuación. Usted no tendrá que [employer] código después de adrianna completado el proceso de registro . Si usted no se inscribe antes de la fecha de caducidad , debe solicitar un nuevo código. · MyChart Código de acceso : [id number] Expires: 7/2/2018  6:08 PM 
 
Ingresa los últimos cuatro dígitos de enriquez Número de Seguro Social ( xxxx ) y fecha de nacimiento ( dd / mm / aaaa ) zora se indica y jasmyn clic en Enviar. Usted será llevado a la siguiente página de registro . Crear un ID MyChart . Esta será enriquez ID de inicio de sesión de MyChart y no puede ser [place] , por lo que pensar en geo que es Irasema Peterson y fácil de recordar . Crear geo contraseña MyChart . Usted puede cambiar enriquez contraseña en cualquier momento . Ingrese enriquez Password Reset de preguntas y [place] . Tibbie se puede utilizar en un momento posterior si usted olvida enriquez contraseña. Introduzca enriquez dirección de correo electrónico . Dixie Schofield recibirá geo notificación por correo electrónico cuando la nueva información está disponible en MyChart . Cheron Sofi encarnacion en Registrarse.  Kathi Milling kaleb y descargar porciones de enriquez expediente Rozella  alysha en el enlace de descarga del menú Resumen para descargar [employer] copia portátil de enriquez información médica . Si tiene [employer] , por favor visite la sección de preguntas frecuentes del sitio web MyChart . Recuerde, MyChart NO es que se utilizará para las necesidades urgentes. Para emergencias médicas , llame al 911 . Ahora disponible en enriquez iPhone y Android ! Por favor proporcione zainab resumen de la documentación de cuidado a enriquez próximo proveedor. Your primary care clinician is listed as Rishi Vale. If you have any questions after today's visit, please call [phone].

## 2018-06-07 NOTE — LETTER
NOTIFICATION RETURN TO WORK / SCHOOL 
 
6/7/2018 4:44 PM 
 
Mr. Vanessa Henderson [address] [place] [address] [place] [id number] To Whom It May Concern: 
 
Vanessa Henderson is currently under the care of [facility]. He will return to work on 6/8/18 If there are questions or concerns please have the patient contact our office.  
 
 
 
Sincerely, 
 
 
Krishna Levi MD

## 2018-06-07 NOTE — PROGRESS NOTES
Aliyah Carlos is an 38 y.o. male who presents for   Chief Complaint   Patient presents with    Follow-up     from Neurology; Pt is currently still having pain     Following up for b/l foot pain. Described as a tingling sensation that is worse when he takes his first steps out of bed in the morning and after prolonged periods of sitting. Pain improves when he puts his feet up. Localized to both medial and lateral aspects of plantar surface of both feet. Underwent neurology evaluation, including EMG and nerve conduction studies which were normal. Currently on gabapentin 300 mg which has helped somewhat. Interested in other types of treatments for this long-standing pain (at least 5 years). Allergies - reviewed:   No Known Allergies    Medications - reviewed:   Current Outpatient Prescriptions   Medication Sig    meloxicam (MOBIC) 7.5 mg tablet Take 1 Tab by mouth daily.  gabapentin (NEURONTIN) 300 mg capsule Take 1 Cap by mouth three (3) times daily. No current facility-administered medications for this visit. Past Medical History - reviewed:  Past Medical History:   Diagnosis Date    Arthritis        Past Surgical History - reviewed:   Past Surgical History:   Procedure Laterality Date    HX CHOLECYSTECTOMY      HX ORTHOPAEDIC      right hand surgery 12  years ago       ROS  NEURO: tingling in b/l feet  MUSCULOSKELETAL: b/l foot pain      Physical Exam  Visit Vitals    /84    Pulse 62    Temp 99.7 °F (37.6 °C)    Resp 16    Ht 6' 1\" (1.854 m)    Wt 237 lb (107.5 kg)    SpO2 100%    BMI 31.27 kg/m2       General appearance - alert, well appearing, and in no distress  Neurological - alert, oriented, normal speech, no focal findings or movement disorder noted, normal muscle tone, no tremors, strength 5/5  Musculoskeletal - mild pes planus noted b/l. Mild valgus ankle deviation with weight bearing R foot greater than L.  Slight pronation noted in medial heels b/l with plantar flexion. Negative Tinel's over posterior tibial nerves b/l. No TTP in b/l feet today. Extremities - peripheral pulses normal, no pedal edema, no clubbing or cyanosis  Skin - normal coloration and turgor, no rashes, no suspicious skin lesions noted      Assessment/Plan    ICD-10-CM ICD-9-CM    1. Pes planus of both feet M21.41 734 DISCONTINUED: diclofenac EC (VOLTAREN) 50 mg EC tablet    M21.42     2. Posterior tibial tendon dysfunction M21.40 734 meloxicam (MOBIC) 7.5 mg tablet     Continues to report neuropathy in the plantar aspects of both feet, both medially and laterally. Corresponds to distribution of posterior tibial nerve. Likely posterior tibial tendon dysfunction vs plantar fasciitis (which would not affect the lateral aspect of the foot which patient describes). Advised that he use heel cups and anti-pronating insoles. Will also continue gabapentin is it provided some relief (despite normal EMG and nerve conduction studies) and add on an NSAID. Patient to have evaluation by Dr. Gamaliel Yadav in 2-3 weeks if pain persists for possible US-guided steroid injection. Follow-up Disposition: Not on File    I have discussed the diagnosis with the patient and the intended plan as seen in the above orders. The patient has received an after-visit summary and questions were answered concerning future plans. I have discussed medication side effects and warnings with the patient as well. Jorgito Mac MD  Family Medicine Resident    Patient discussed with Dr. Gamaliel Yadav, attending physician.

## 2018-06-08 ENCOUNTER — TELEPHONE (OUTPATIENT)
Dept: FAMILY MEDICINE CLINIC | Age: 43
End: 2018-06-08

## 2018-06-08 NOTE — TELEPHONE ENCOUNTER
----- Message from Berto Arce sent at 6/8/2018  2:00 PM EDT -----  Regarding: Dr. Lelo Dutton (Wife) is requesting a call back concerning medications for pt.  Best contact [phone]

## 2018-06-15 DIAGNOSIS — M79.672 PAIN IN BOTH FEET: ICD-10-CM

## 2018-06-15 DIAGNOSIS — M79.671 PAIN IN BOTH FEET: ICD-10-CM

## 2018-06-15 NOTE — TELEPHONE ENCOUNTER
[id number] pt wife Kassidy Fernandez  was in for a visit. Has been trying to get his gabapentin since the 8th of this month, please send to pharmacy as soon as possible. He has an appt on the 28th. Patients wife also states that he was supposed to receive something else for pain, but nothing was sent to the pharmacy, she is requesting that it be generic, since the name brand meds are expensive. Pt's wife would like a call when complete at above number.   Thank you

## 2018-06-18 RX ORDER — GABAPENTIN 300 MG/1
300 CAPSULE ORAL 3 TIMES DAILY
Qty: 90 CAP | Refills: 0 | Status: SHIPPED | OUTPATIENT
Start: 2018-06-18 | End: 2019-04-12

## 2018-06-28 ENCOUNTER — OFFICE VISIT (OUTPATIENT)
Dept: FAMILY MEDICINE CLINIC | Age: 43
End: 2018-06-28

## 2018-06-28 VITALS
HEART RATE: 79 BPM | SYSTOLIC BLOOD PRESSURE: 123 MMHG | TEMPERATURE: 98.9 F | RESPIRATION RATE: 14 BRPM | HEIGHT: 73 IN | OXYGEN SATURATION: 100 % | DIASTOLIC BLOOD PRESSURE: 83 MMHG | BODY MASS INDEX: 31.28 KG/M2 | WEIGHT: 236 LBS

## 2018-06-28 DIAGNOSIS — M79.671 RIGHT FOOT PAIN: Primary | ICD-10-CM

## 2018-06-28 RX ORDER — BETAMETHASONE SODIUM PHOSPHATE AND BETAMETHASONE ACETATE 3; 3 MG/ML; MG/ML
6 INJECTION, SUSPENSION INTRA-ARTICULAR; INTRALESIONAL; INTRAMUSCULAR; SOFT TISSUE ONCE
Qty: 1 ML | Refills: 0
Start: 2018-06-28 | End: 2018-06-28

## 2018-06-28 RX ORDER — LIDOCAINE HYDROCHLORIDE 10 MG/ML
1 INJECTION INFILTRATION; PERINEURAL ONCE
Qty: 1 VIAL | Refills: 0
Start: 2018-06-28 | End: 2018-06-28

## 2018-06-28 NOTE — MR AVS SNAPSHOT
[address] 
[phone] Patient: Talha Duenas MRN: [id number] :[id number] Visit Information Date & Time Provider Department Dept. Phone Encounter #  
 2018  3:30 PM Terri Gutierrez, Alie Souza [phone] Upcoming Health Maintenance Date Due DTaP/Tdap/Td series (1 - Tdap) 1998 Influenza Age 5 to Adult 2018 Allergies as of 2018  Review Complete On: 2018 By: Kaycee Lewis LPN No Known Allergies Current Immunizations  Never Reviewed No immunizations on file. Not reviewed this visit Vitals BP Pulse Temp Resp Height(growth percentile) Weight(growth percentile) 123/83 (BP 1 Location: Right arm, BP Patient Position: Sitting) 79 98.9 °F (37.2 °C) (Oral) 14 6' 1\" (1.854 m) 236 lb (107 kg) SpO2 BMI Smoking Status 100% 31.14 kg/m2 Former Smoker Vitals History BMI and BSA Data Body Mass Index Body Surface Area  
 31.14 kg/m 2 2.35 m 2 [facility] Name Phone [address], [address] [place] [address] [phone] Your Updated Medication List  
  
   
This list is accurate as of 18  4:03 PM.  Always use your most recent med list.  
  
  
  
  
 gabapentin 300 mg capsule Commonly known as:  NEURONTIN Take 1 Cap by mouth three (3) times daily. meloxicam 7.5 mg tablet Commonly known as:  MOBIC Take 1 Tab by mouth daily. Introducing [facility]! [facility] introduce portal paciente MyChart . Ahora se puede acceder a partes de enriquez expediente médico, enviar por correo electrónico la oficina de enriquez médico y solicitar renovaciones de medicamentos en línea. En enriquez navegador de Internet , Dewayne Florian a [email or web address] Jaren clic en el usuario por Marisa Etienne?  Darreld Flood clic aquí en la Roxene Males. Verá la página de registro Emerado. Ingrese enriquez código de [employer] doyle y zora aparece a continuación. Usted no tendrá que [employer] código después de adrianna completado el proceso de registro . Si usted no se inscribe antes de la fecha de caducidad , debe solicitar un nuevo código. · MyChart Código de acceso : [id number] Expires: 7/2/2018  6:08 PM 
 
Ingresa los últimos cuatro dígitos de enriquez Número de Seguro Social ( xxxx ) y fecha de nacimiento ( dd / mm / aaaa ) zora se indica y jaren clic en Enviar. Usted será llevado a la siguiente página de registro . Crear un ID MyChart . Esta será enriquez ID de inicio de sesión de MyChart y no puede ser [place] , por lo que pensar en geo que es Marie Franz y fácil de recordar . Crear geo contraseña MyChart . Usted puede cambiar enriquez contraseña en cualquier momento . Ingrese enriquez Password Reset de preguntas y [place] . [place] se puede utilizar en un momento posterior si usted olvida enriquez contraseña. Introduzca enriquez dirección de correo electrónico . Rita Prime recibirá geo notificación por correo electrónico cuando la nueva información está disponible en MyChart . Thedore Carolann clic en Registrarse. Valene Karime kaleb y descargar porciones de enriquez expediente médico. 
Jaren clic en el enlace de descarga del menú Resumen para descargar geo copia portátil de enriquez información médica . Si tiene [employer] , por favor visite la sección de preguntas frecuentes del sitio web MyChart . Recuerde, MyChart NO es que se utilizará para las necesidades urgentes. Para emergencias médicas , llame al 911 . Ahora disponible en enriquez iPhone y Android ! Por favor proporcione zainab resumen de la documentación de cuidado a enriquez próximo proveedor. Your primary care clinician is listed as Edouard Son. If you have any questions after today's visit, please call [phone].

## 2018-06-28 NOTE — PROGRESS NOTES
Subjective:     Katy Pierce is an 38 y.o. male who presents with bilaterally foot pain. Pain is located in back of heels and on lateral and medial side of plantar side that has been going for about 5-7 years. Pain is constant everyday. Patient has tried Gabapentin in the past with some improvement at the beginning and tried Meloxicam without help. At last visit he was advised to use heel cups and anti-pronating insoles but he is not using them. He has seen Neurology in the past and EMG was normal.      Past Medical History:   Diagnosis Date    Arthritis      Family History   Problem Relation Age of Onset    Diabetes Mother     Hypertension Mother     Hypertension Father     No Known Problems Sister     No Known Problems Brother      Current Outpatient Prescriptions   Medication Sig Dispense Refill    gabapentin (NEURONTIN) 300 mg capsule Take 1 Cap by mouth three (3) times daily. 90 Cap 0    meloxicam (MOBIC) 7.5 mg tablet Take 1 Tab by mouth daily. 30 Tab 0     No Known Allergies  Social History     Social History    Marital status:      Spouse name: N/A    Number of children: N/A    Years of education: N/A     Occupational History    Not on file. Social History Main Topics    Smoking status: Former Smoker    Smokeless tobacco: Never Used    Alcohol use 0.6 oz/week     1 Cans of beer per week      Comment: social, 1 beer / 2 months    Drug use: No    Sexual activity: Yes     Partners: Female     Other Topics Concern    Not on file     Social History Narrative       Review of Systems  Pertinent items are noted in HPI. Objective:     Visit Vitals    /83 (BP 1 Location: Right arm, BP Patient Position: Sitting)    Pulse 79    Temp 98.9 °F (37.2 °C) (Oral)    Resp 14    Ht 6' 1\" (1.854 m)    Wt 236 lb (107 kg)    SpO2 100%    BMI 31.14 kg/m2       Gen: No apparent distress. Alert and oriented. Responds to all questions appropriately.    Foot: bilateral  Ankle Effusion: None  Great Toe MTP (normal/valgus/hallux rigidis/varus): Normal  Pes planus and hindfoot valgus with minimal correction to varus with heel rise. Toe many toes sign positive. ROM: FROM    Dynamic Test:  Gait: Normal  Stover test: Normal  Negative tinels over the tarsal tunnel     Palpation:  ATFL tenderness: None  CFL tenderness: None  PTFL tenderness: None  Deltoid tenderness: None  Achilles insertion tenderness: None   Achilles tendon tenderness: None   Great Toe IP joint tenderness: None  Great Toe MTP join tenderness t: None  Lisfranc Joint tenderness: None  Medial Malleolus tenderness: None  Lateral Malleolus tenderness: None  5th Metatarsal tenderness: None  Metatarsals tenderness: None  Navicular tenderness: None  Plantar fascia heel tenderness: None    Instability:  Anterior Drawer: Normal  Talar Tilt: Normal  Syndesmosis Tenderness: None  External Rotation Test: Normal  Squeeze Test: Normal  Peroneal Subluxation: Normal    Strength (0-5/5):   Plantarflexion: 5/5  Dorsiflexion: 5/5  Inversion: 5/5  Eversion: 5/5  FHL: 5/5  EHL: 5/5    Neuro/Vascular:  Pulses intact, no edema, and neurologically intact. Skin: No obvious rash    Imaging: Radiographs of both feet from 11/28/17 personally reviewed and demonstrates no obvious fracture or dislocation. PROCEDURE NOTE:     Informed consent obtained verbally and risks, benefits and alternatives discussed. Time out performed, cross checking patient ID and procedure. The left posterior medial ankle was cleaned and prepped with sterile technique using Chloraprep x3 and anesthetized with ethyl chloride spray. The tarsal tunnel was identified with ultrasound and injected from a posterior medial approach with Celestone 6mg and 1ml of 1% lidocaine under sterile conditions using ultrasound guidance. The patient tolerated the procedure well and there were no complications. Patient reported pain relief after the injection.         Assessment:   Bilateral foot pain.  Uncertain the exact etiology. Possible tarsal tunnel syndrome or possible impingement of one of it's branches such as the lateral plantar nerve. Will try an injection of the left tarsal tunnel to see if this offers any relief. Plan:       1. Home Exercise Program as per handout. 2. Ice 15 minutes, three times a day PRN and after exercise. Can alternate with heat for 15 minutes. 3. Corticosteroid injection as above of the left tarsal tunnel. 4. Discussed proper footwear and using antipronator shoe inserts. Medications:    1. Naproxin (Aleve): 220mg 1-2 tablets twice a day PRN. 2. Acetaminophen (Tylenol):  500mg 1-2 tablets every 6 hours as needed for pain.     RTC: 2-4 weeks

## 2018-06-28 NOTE — PROGRESS NOTES
[facility]  OFFICE PROCEDURE PROGRESS NOTE        Chart reviewed for the following:   I, Dr. Bib Raymond, have reviewed the History, Physical and updated the Allergic reactions for [facility]     TIME OUT performed immediately prior to start of procedure:   I, Dr. Bib Raymond, have performed the following reviews on Talha Colop prior to the start of the procedure:            * Patient was identified by name and date of birth   * Agreement on procedure being performed was verified  * Risks and Benefits explained to the patient  * Procedure site verified and marked as necessary  * Patient was positioned for comfort  * Consent was signed and verified     Time: 4:39pm      Date of procedure: 6/28/2018    Procedure performed by:  Sarbjit Chavis MD

## 2018-07-12 ENCOUNTER — OFFICE VISIT (OUTPATIENT)
Dept: FAMILY MEDICINE CLINIC | Age: 43
End: 2018-07-12

## 2018-07-12 VITALS
RESPIRATION RATE: 18 BRPM | DIASTOLIC BLOOD PRESSURE: 79 MMHG | HEIGHT: 73 IN | OXYGEN SATURATION: 100 % | HEART RATE: 83 BPM | BODY MASS INDEX: 31.68 KG/M2 | TEMPERATURE: 98.3 F | SYSTOLIC BLOOD PRESSURE: 125 MMHG | WEIGHT: 239 LBS

## 2018-07-12 DIAGNOSIS — M79.672 BILATERAL FOOT PAIN: Primary | ICD-10-CM

## 2018-07-12 DIAGNOSIS — M79.671 BILATERAL FOOT PAIN: Primary | ICD-10-CM

## 2018-07-12 NOTE — PROGRESS NOTES
Chief Complaint   Patient presents with    Foot Pain     f/u  pt requesting injection      1. Have you been to the ER, urgent care clinic since your last visit? Hospitalized since your last visit? No    2. Have you seen or consulted any other health care providers outside of the [address] [place] [address] since your last visit? Include any pap smears or colon screening.  No

## 2018-07-12 NOTE — LETTER
NOTIFICATION RETURN TO WORK / SCHOOL 
 
7/12/2018 4:03 PM 
 
Mr. Javier Rodríguez [address] 
[address] [place] [phone] To Whom It May Concern: 
 
Javier Rodríguez is currently under the care of [facility]. He will return to work/school on: 7/13/18 If there are questions or concerns please have the patient contact our office.  
 
 
 
Sincerely, 
 
 
Kenneth Oviedo MD

## 2018-07-12 NOTE — PROGRESS NOTES
Subjective:     Radha Turpin is an 38 y.o. male who presents with bilaterally foot pain. Pain is located in back of heels and on lateral and medial side of plantar side that has been going for about 5-7 years. Pain is constant everyday. Patient has tried Gabapentin in the past with some improvement at the beginning and tried Meloxicam without help. At last visit he was advised to use heel cups and anti-pronating insoles but he is not using them. He has seen Neurology in the past and EMG was normal.  Tarsal tunnel injection on the left ankle was performed 2 weeks ago and offered no relief of sx. Past Medical History:   Diagnosis Date    Arthritis      Family History   Problem Relation Age of Onset    Diabetes Mother     Hypertension Mother     Hypertension Father     No Known Problems Sister     No Known Problems Brother      Current Outpatient Prescriptions   Medication Sig Dispense Refill    gabapentin (NEURONTIN) 300 mg capsule Take 1 Cap by mouth three (3) times daily. 90 Cap 0    meloxicam (MOBIC) 7.5 mg tablet Take 1 Tab by mouth daily. 30 Tab 0     No Known Allergies  Social History     Social History    Marital status:      Spouse name: N/A    Number of children: N/A    Years of education: N/A     Occupational History    Not on file. Social History Main Topics    Smoking status: Former Smoker    Smokeless tobacco: Never Used    Alcohol use 0.6 oz/week     1 Cans of beer per week      Comment: social, 1 beer / 2 months    Drug use: No    Sexual activity: Yes     Partners: Female     Other Topics Concern    Not on file     Social History Narrative       Review of Systems  Pertinent items are noted in HPI. Objective:     Visit Vitals    /79 (BP 1 Location: Left arm, BP Patient Position: Sitting)    Pulse 83    Temp 98.3 °F (36.8 °C) (Oral)    Resp 18    Ht 6' 1\" (1.854 m)    Wt 239 lb (108.4 kg)    SpO2 100%    BMI 31.53 kg/m2       Gen: No apparent distress. Alert and oriented. Responds to all questions appropriately. Foot: bilateral  Ankle Effusion: None  Great Toe MTP (normal/valgus/hallux rigidis/varus): Normal  Pes planus and hindfoot valgus with minimal correction to varus with heel rise. Positive toe many toes sign bilaterally. ROM: FROM    Dynamic Test:  Gait: Normal    Palpation:  ATFL tenderness: None  CFL tenderness: None  PTFL tenderness: None  Deltoid tenderness: None  Achilles insertion tenderness: None   Achilles tendon tenderness: None   Great Toe IP joint tenderness: None  Great Toe MTP join tenderness t: None  Lisfranc Joint tenderness: None  Medial Malleolus tenderness: None  Lateral Malleolus tenderness: None  5th Metatarsal tenderness: None  Metatarsals tenderness: None  Navicular tenderness: None  Plantar fascia heel tenderness: None    Strength (0-5/5):   Plantarflexion: 5/5  Dorsiflexion: 5/5  Inversion: 5/5  Eversion: 5/5  FHL: 5/5  EHL: 5/5    Neuro/Vascular:  Pulses intact, no edema, and neurologically intact. Skin: No obvious rash    Imaging: Radiographs of both feet from 11/28/17 personally reviewed and demonstrates no obvious fracture or dislocation. Assessment:   Bilateral foot pain. Uncertain the exact etiology. Likely due to posterior tibial insufficiency. No improvement with corticosteroid injection of the tarsal tunnel. Plan:       1. Home Exercise Program as per handout. 2. Ice 15 minutes, three times a day PRN and after exercise. Can alternate with heat for 15 minutes. 3. Discussed proper footwear and using antipronator shoe inserts. Medications:    1. Naproxin (Aleve): 220mg 1-2 tablets twice a day PRN. 2. Acetaminophen (Tylenol):  500mg 1-2 tablets every 6 hours as needed for pain.     RTC: 2-4 weeks

## 2019-04-12 ENCOUNTER — OFFICE VISIT (OUTPATIENT)
Dept: FAMILY MEDICINE CLINIC | Age: 44
End: 2019-04-12

## 2019-04-12 VITALS — BODY MASS INDEX: 30.35 KG/M2 | WEIGHT: 229 LBS | HEIGHT: 73 IN

## 2019-04-12 DIAGNOSIS — M79.671 BILATERAL FOOT PAIN: Primary | ICD-10-CM

## 2019-04-12 DIAGNOSIS — M79.672 BILATERAL FOOT PAIN: Primary | ICD-10-CM

## 2019-04-12 RX ORDER — AMITRIPTYLINE HYDROCHLORIDE 10 MG/1
10 TABLET, FILM COATED ORAL
Qty: 30 TAB | Refills: 2 | Status: SHIPPED | OUTPATIENT
Start: 2019-04-12 | End: 2019-06-19 | Stop reason: DRUGHIGH

## 2019-04-12 NOTE — PROGRESS NOTES
Chief Complaint Patient presents with  Foot Pain  
  pt states bital foot pain for years and seen previously by neurology with no results, numbness and tingling gotten worst ove r the last 3 weeks 1. Have you been to the ER, urgent care clinic since your last visit? Hospitalized since your last visit? No 
 
2. Have you seen or consulted any other health care providers outside of the [address] [place] [address] since your last visit? Include any pap smears or colon screening.  No

## 2019-04-12 NOTE — LETTER
NOTIFICATION RETURN TO WORK / SCHOOL 
 
04/12/2019 4:15 PM 
 
Mr. Lucien La [address] 
[address] [place] [phone] To Whom It May Concern: 
 
Lucien La is currently under the care of [facility] [employer]. He will return to work/school on: 04/22/2019 If there are questions or concerns please have the patient contact our office.  
 
 
 
Sincerely, 
 
 
Annie Bailey MD

## 2019-04-12 NOTE — PROGRESS NOTES
Mac Gonzalez is a 41 y.o. male who presents for bilateral foot pain and tingling. Has been going on for years (7-8 years). Over the past month has been more intense and persistent pain and spreading to distal shins. Having burning more persistently over the balls of the feet. Notes pain feels like it primarily over the lateral aspect of the foot as well as heels. Feels legs have been weaker over last week and having cramping in the toes. Cramping 5-6X over the past month. Has been evaluated by neurology with normal EMG. No longer taking gabapentin nor meloxicam. Took 2- 600mg ibuprofen tablet yesterday without pain relief. Heel cups and anti-pronator shoe inserts were recommend during past clinic visits but he has not tried these. PMHx: 
Past Medical History:  
Diagnosis Date  Arthritis Med Current Outpatient Medications Medication Sig Dispense Refill  gabapentin (NEURONTIN) 300 mg capsule Take 1 Cap by mouth three (3) times daily. 90 Cap 0  
 meloxicam (MOBIC) 7.5 mg tablet Take 1 Tab by mouth daily. 30 Tab 0 Allergies:  
No Known Allergies Smoker: 
Social History Tobacco Use Smoking Status Former Smoker Smokeless Tobacco Never Used ETOH:  
Social History Substance and Sexual Activity Alcohol Use Yes  Alcohol/week: 0.6 oz  Types: 1 Cans of beer per week Comment: social, 1 beer / 2 months FH:  
Family History Problem Relation Age of Onset  Diabetes Mother  Hypertension Mother  Hypertension Father  No Known Problems Sister  No Known Problems Brother ROS: 
Cardiac:    No chest pain Respiratory:   No cough or shortness of breath :   No dysuria or  hematuria Neurological:   No falls, + weakness in legs. Denies HA, cognitive changes, balance problems. Skin: No rash Physical Exam: 
Visit Vitals BP (P) 122/80 (BP 1 Location: Left arm, BP Patient Position: Sitting) Pulse (P) 69  
 Temp (P) 98.9 °F (37.2 °C) (Oral) Resp (P) 20 Ht 6' 1\" (1.854 m) Wt 229 lb (103.9 kg) SpO2 (P) 100% BMI 30.21 kg/m² Ankle/Foot: bilateral 
Ankle Effusion: None Great Toe MTP: Normal 
Proprioception intact Sensation intact to microfilament testing ROM: 
Plantarflexion: full Dorsiflexion: full First MTP Joint: full Inversion: full Eversion: full Alignment: 
Knees: neutral 
Hindfoot: valgus Midfoot: pronated Forefoot: planus Dynamic Test: 
Gait: Normal 
Bilateral heel rise: goes to neutral from valgus position at rest.  Does not go to Varus. Palpation: ATFL tenderness: None CFL tenderness: None PTFL tenderness: None Deltoid tenderness: None Achilles insertion tenderness: None Achilles tendon tenderness: None Great Toe IP joint tenderness: None Great Toe MTP join tenderness t: None Lisfranc Joint tenderness: None Medial Malleolus tenderness: None Lateral Malleolus tenderness: None 
5th Metatarsal tenderness: None Metatarsals tenderness: None Navicular tenderness: None Plantar fascia heel tenderness: None Strength (0-5/5):  
Plantarflexion: 5/5 Dorsiflexion: 5/5 Inversion: 5/5 Eversion: 5/5 FHL: 5/5 EHL: 5/5 Assessment: 
Foot pain likely related to tarsal tunnel syndrome. Plan: 
Discussed proper footwear. Discussed trying anti-pronator shoe inserts and/or heel cups. Will start Elavil 10mg QHS. RTC: 1 month.

## 2019-06-19 ENCOUNTER — OFFICE VISIT (OUTPATIENT)
Dept: FAMILY MEDICINE CLINIC | Age: 44
End: 2019-06-19

## 2019-06-19 VITALS
HEART RATE: 86 BPM | OXYGEN SATURATION: 98 % | SYSTOLIC BLOOD PRESSURE: 115 MMHG | TEMPERATURE: 98.4 F | DIASTOLIC BLOOD PRESSURE: 77 MMHG | RESPIRATION RATE: 16 BRPM | BODY MASS INDEX: 30.88 KG/M2 | HEIGHT: 73 IN | WEIGHT: 233 LBS

## 2019-06-19 DIAGNOSIS — G62.9 NEUROPATHY: ICD-10-CM

## 2019-06-19 DIAGNOSIS — M79.671 BILATERAL FOOT PAIN: Primary | ICD-10-CM

## 2019-06-19 DIAGNOSIS — M79.672 BILATERAL FOOT PAIN: Primary | ICD-10-CM

## 2019-06-19 RX ORDER — AMITRIPTYLINE HYDROCHLORIDE 25 MG/1
25 TABLET, FILM COATED ORAL
Qty: 30 TAB | Refills: 2 | Status: SHIPPED | OUTPATIENT
Start: 2019-06-19 | End: 2019-09-20 | Stop reason: SDUPTHER

## 2019-06-19 NOTE — PROGRESS NOTES
Chief Complaint   Patient presents with    Follow-up     bilateral foot pain with little bit improvement currently taking amitriptyline twice a day     1. Have you been to the ER, urgent care clinic since your last visit? Hospitalized since your last visit? No    2. Have you seen or consulted any other health care providers outside of the [address] [place] [address] since your last visit? Include any pap smears or colon screening.  No

## 2019-06-19 NOTE — PROGRESS NOTES
Subjective:     Yuliet Lopez is an 41 y.o. male who presents for f/u of bilateral foot neuropathy. Currently prescribed Elavil 10mg daily but he increased to BID. He has noticed decreased pain since increasing to BID. No side effects of the medications. He has also been wearing walking/athletic shoes and heel cups. He overall pleased with the improvement in his pain. Past Medical History:   Diagnosis Date    Arthritis      Family History   Problem Relation Age of Onset    Diabetes Mother     Hypertension Mother     Hypertension Father     No Known Problems Sister     No Known Problems Brother      Current Outpatient Medications   Medication Sig Dispense Refill    amitriptyline (ELAVIL) 25 mg tablet Take 1 Tab by mouth nightly. 30 Tab 2     No Known Allergies  Social History     Socioeconomic History    Marital status:      Spouse name: Not on file    Number of children: Not on file    Years of education: Not on file    Highest education level: Not on file   Occupational History    Not on file   Social Needs    Financial resource strain: Not on file    Food insecurity:     Worry: Not on file     Inability: Not on file    Transportation needs:     Medical: Not on file     Non-medical: Not on file   Tobacco Use    Smoking status: Former Smoker    Smokeless tobacco: Never Used   Substance and Sexual Activity    Alcohol use:  Yes     Alcohol/week: 0.6 oz     Types: 1 Cans of beer per week     Comment: social, 1 beer / 2 months    Drug use: No    Sexual activity: Yes     Partners: Female   Lifestyle    Physical activity:     Days per week: Not on file     Minutes per session: Not on file    Stress: Not on file   Relationships    Social connections:     Talks on phone: Not on file     Gets together: Not on file     Attends Orthodox service: Not on file     Active member of club or organization: Not on file     Attends meetings of clubs or organizations: Not on file     Relationship status: Not on file    Intimate partner violence:     Fear of current or ex partner: Not on file     Emotionally abused: Not on file     Physically abused: Not on file     Forced sexual activity: Not on file   Other Topics Concern    Not on file   Social History Narrative    Not on file       Review of Systems  Pertinent items are noted in HPI. Objective:     Visit Vitals  /77 (BP 1 Location: Left arm, BP Patient Position: Sitting)   Pulse 86   Temp 98.4 °F (36.9 °C) (Oral)   Resp 16   Ht 6' 1\" (1.854 m)   Wt 233 lb (105.7 kg)   SpO2 98%   BMI 30.74 kg/m²       Gen: No apparent distress. Alert and oriented. Responds to all questions appropriately. Ankle/Foot: bilateral  Ankle Effusion: None  Great Toe MTP (normal/valgus/hallux rigidis/varus): Normal    ROM: FROM    Alignment:  Hindfoot: valgus  Midfoot: planus  Forefoot: pronated     Dynamic Test:  Gait: Normal    Strength (0-5/5):   Plantarflexion: 5/5  Dorsiflexion: 5/5  Inversion: 5/5  Eversion: 5/5  FHL: 5/5  EHL: 5/5    Neuro/Vascular:  Pulses intact, no edema, and neurologically intact. Skin: No obvious rash      Assessment:       ICD-10-CM ICD-9-CM    1. Bilateral foot pain M79.671 729.5     M79.672     2. Neuropathy G62.9 355.9      Improving with Elavil    Plan: Will increase Elavil to 25mg daily. Discussed potential side effects to be aware of. Continue with walking/athletic shoes and heel cups.   HEP    RTC: 3 months and PRN

## 2019-06-19 NOTE — LETTER
NOTIFICATION RETURN TO WORK / SCHOOL 
 
6/19/2019 4:40 PM 
 
Mr. Placido Lesch [address] [phone] To Whom It May Concern: 
 
Placido Lesch is currently under the care of [facility]. Patient had an appointment today, 06/19/2019 at 4:00pm. 
 
If there are questions or concerns please have the patient contact our office.  
 
 
 
Sincerely, 
 
 
Hector Blanchard MD

## 2019-09-20 RX ORDER — AMITRIPTYLINE HYDROCHLORIDE 25 MG/1
25 TABLET, FILM COATED ORAL
Qty: 30 TAB | Refills: 2 | Status: SHIPPED | OUTPATIENT
Start: 2019-09-20 | End: 2019-12-20 | Stop reason: SDUPTHER

## 2019-10-18 ENCOUNTER — OFFICE VISIT (OUTPATIENT)
Dept: FAMILY MEDICINE CLINIC | Age: 44
End: 2019-10-18

## 2019-10-18 VITALS
RESPIRATION RATE: 16 BRPM | BODY MASS INDEX: 31.81 KG/M2 | SYSTOLIC BLOOD PRESSURE: 106 MMHG | DIASTOLIC BLOOD PRESSURE: 71 MMHG | HEIGHT: 73 IN | TEMPERATURE: 98.7 F | WEIGHT: 240 LBS | HEART RATE: 76 BPM | OXYGEN SATURATION: 98 %

## 2019-10-18 DIAGNOSIS — G62.9 NEUROPATHY: ICD-10-CM

## 2019-10-18 DIAGNOSIS — Z00.00 ANNUAL PHYSICAL EXAM: Primary | ICD-10-CM

## 2019-10-18 NOTE — LETTER
NOTIFICATION RETURN TO WORK / SCHOOL 
 
10/18/2019 2:47 PM 
 
Mr. Yara Kevin [address] 
[facility] [phone] To Whom It May Concern: 
 
Yara Kevin is currently under the care of [facility] [employer]. He will return to work/school on: 10/19/19 If there are questions or concerns please have the patient contact our office.  
 
 
 
Sincerely, 
 
 
Abdulaziz Sharpe MD

## 2019-10-18 NOTE — PROGRESS NOTES
Chief Complaint   Patient presents with    Well Woman     non fasting labs, discuss medication amitriptyline     1. Have you been to the ER, urgent care clinic since your last visit? Hospitalized since your last visit? No    2. Have you seen or consulted any other health care providers outside of the [address] [place] [address] since your last visit? Include any pap smears or colon screening.  No     Health Maintenance Due   Topic Date Due    DTaP/Tdap/Td series (1 - Tdap) 07/17/1998    Influenza Age 5 to Adult  08/01/2019

## 2019-10-21 NOTE — PROGRESS NOTES
Yara Kevin is a 45 y.o. male who presents for annual physical.  He continue to have bilateral foot pain. The Elavil has helped with the pain but sx have started to worsen over the last several weeks. No change in activities. He reports taking the medication as prescribed. No regular exercise. Reports a good appetite and diet. PMHx:  Past Medical History:   Diagnosis Date    Arthritis        Meds:   Current Outpatient Medications   Medication Sig Dispense Refill    acetaminophen (TYLENOL PO) Take  by mouth.  amitriptyline (ELAVIL) 25 mg tablet Take 1 Tab by mouth nightly. 30 Tab 2       Allergies:   No Known Allergies    Smoker:  Social History     Tobacco Use   Smoking Status Former Smoker   Smokeless Tobacco Never Used       ETOH:   Social History     Substance and Sexual Activity   Alcohol Use Yes    Alcohol/week: 1.0 standard drinks    Types: 1 Cans of beer per week    Comment: social, 1 beer / 2 months       FH:   Family History   Problem Relation Age of Onset    Diabetes Mother     Hypertension Mother     Hypertension Father     No Known Problems Sister     No Known Problems Brother        ROS:  Per HPI    Physical Exam:  Visit Vitals  /71 (BP 1 Location: Left arm, BP Patient Position: Sitting)   Pulse 76   Temp 98.7 °F (37.1 °C) (Oral)   Resp 16   Ht 6' 1\" (1.854 m)   Wt 240 lb (108.9 kg)   SpO2 98%   BMI 31.66 kg/m²     GEN: No apparent distress. Alert and oriented and responds to all questions appropriately. EYES:  Conjunctiva clear; pupils round and reactive to light; extraocular movements are intact. EAR: External ears are normal.  Tympanic membranes are clear and without effusion. NOSE: Turbinates are within normal limits. No drainage  OROPHYARYNX: No oral lesions or exudates.   NECK:  Supple; no masses; thyroid normal           LUNGS: Respirations unlabored; clear to auscultation bilaterally  CARDIOVASCULAR: Regular, rate, and rhythm without murmurs, gallops or rubs ABDOMEN: Soft; nontender; nondistended; normoactive bowel sounds; no masses or organomegaly  NEUROLOGIC:  No focal neurologic deficits. EXT: Well perfused. No edema. SKIN: No obvious rashes. Assessment:    ICD-10-CM ICD-9-CM    1. Annual physical exam S39.66 J79.1 METABOLIC PANEL, COMPREHENSIVE      LIPID PANEL      CBC W/O DIFF      TSH 3RD GENERATION       Plan:  Labs as above. He will continue Elavil 25mg daily for peripheral neuropathy. He will schedule a neurology visit for f/u. He declined the flu vaccination    RTC: 1 year and PRN.

## 2019-10-28 ENCOUNTER — LAB ONLY (OUTPATIENT)
Dept: FAMILY MEDICINE CLINIC | Age: 44
End: 2019-10-28

## 2019-10-29 LAB
ALBUMIN SERPL-MCNC: 4.2 G/DL (ref 3.5–5.5)
ALBUMIN/GLOB SERPL: 1.6 {RATIO} (ref 1.2–2.2)
ALP SERPL-CCNC: 52 IU/L (ref 39–117)
ALT SERPL-CCNC: 27 IU/L (ref 0–44)
AST SERPL-CCNC: 18 IU/L (ref 0–40)
BILIRUB SERPL-MCNC: 0.3 MG/DL (ref 0–1.2)
BUN SERPL-MCNC: 13 MG/DL (ref 6–24)
BUN/CREAT SERPL: 13 (ref 9–20)
CALCIUM SERPL-MCNC: 8.7 MG/DL (ref 8.7–10.2)
CHLORIDE SERPL-SCNC: 107 MMOL/L (ref 96–106)
CHOLEST SERPL-MCNC: 155 MG/DL (ref 100–199)
CO2 SERPL-SCNC: 23 MMOL/L (ref 20–29)
CREAT SERPL-MCNC: 0.99 MG/DL (ref 0.76–1.27)
ERYTHROCYTE [DISTWIDTH] IN BLOOD BY AUTOMATED COUNT: 12.9 % (ref 12.3–15.4)
GLOBULIN SER CALC-MCNC: 2.6 G/DL (ref 1.5–4.5)
GLUCOSE SERPL-MCNC: 85 MG/DL (ref 65–99)
HCT VFR BLD AUTO: 42.4 % (ref 37.5–51)
HDLC SERPL-MCNC: 42 MG/DL
HGB BLD-MCNC: 14 G/DL (ref 13–17.7)
INTERPRETATION, 910389: NORMAL
LDLC SERPL CALC-MCNC: 89 MG/DL (ref 0–99)
MCH RBC QN AUTO: 29.6 PG (ref 26.6–33)
MCHC RBC AUTO-ENTMCNC: 33 G/DL (ref 31.5–35.7)
MCV RBC AUTO: 90 FL (ref 79–97)
PLATELET # BLD AUTO: 242 X10E3/UL (ref 150–450)
POTASSIUM SERPL-SCNC: 4.2 MMOL/L (ref 3.5–5.2)
PROT SERPL-MCNC: 6.8 G/DL (ref 6–8.5)
RBC # BLD AUTO: 4.73 X10E6/UL (ref 4.14–5.8)
SODIUM SERPL-SCNC: 143 MMOL/L (ref 134–144)
TRIGL SERPL-MCNC: 119 MG/DL (ref 0–149)
TSH SERPL DL<=0.005 MIU/L-ACNC: 1.54 UIU/ML (ref 0.45–4.5)
VLDLC SERPL CALC-MCNC: 24 MG/DL (ref 5–40)
WBC # BLD AUTO: 6.3 X10E3/UL (ref 3.4–10.8)

## 2019-12-20 NOTE — TELEPHONE ENCOUNTER
----- Message from Antonio Liu sent at 12/20/2019  8:40 AM EST -----  Regarding: Dr. Ari Bansal first and last name:  Pt    Reason for call:  Pt requesting new rx for \"anitriptylin 25mg\" be sent to [facility] DR ANA MARIA ORTEZ on file ([address])    Callback required yes/no and why:  Yes    Best contact number(s):  [phone] (pt) [phone] [facility])    Details to clarify the request:  Pt is out of refills and will be out of med soon.      Antonio Liu

## 2019-12-26 NOTE — TELEPHONE ENCOUNTER
Another [employer]. Dr. Nora [place]: Today   Message Contents   [address], [address] Office   Phone Number: [phone]             Caller (if not patient): Lee Gee   Relationship of caller (if not patient): Maggie Dow contact number(s): [phone]   Name of medication and dosage if known: n/a medication for pain in his foot. Is patient out of this medication (yes/no): yes   Pharmacy name: [facility] listed in chart? (yes/no): yes   Pharmacy phone number: n/a   Date of last visit:  Monday, October 28, 2019 08:00 AM   Details to clarify the request: Pt has been trying to get a refill for about a week.

## 2019-12-27 RX ORDER — AMITRIPTYLINE HYDROCHLORIDE 25 MG/1
25 TABLET, FILM COATED ORAL
Qty: 30 TAB | Refills: 0 | Status: SHIPPED | OUTPATIENT
Start: 2019-12-27 | End: 2020-01-28 | Stop reason: DRUGHIGH

## 2019-12-27 NOTE — TELEPHONE ENCOUNTER
Med refilled as Dr. Jake Hernandez is out on vacation. Patient needs to follow-up with Dr. Jake Hernandez for future refills.      Cynthia Wright, DO

## 2020-01-28 ENCOUNTER — OFFICE VISIT (OUTPATIENT)
Dept: FAMILY MEDICINE CLINIC | Age: 45
End: 2020-01-28

## 2020-01-28 VITALS
RESPIRATION RATE: 16 BRPM | TEMPERATURE: 98.9 F | HEIGHT: 73 IN | SYSTOLIC BLOOD PRESSURE: 125 MMHG | HEART RATE: 69 BPM | OXYGEN SATURATION: 100 % | BODY MASS INDEX: 31.41 KG/M2 | DIASTOLIC BLOOD PRESSURE: 77 MMHG | WEIGHT: 237 LBS

## 2020-01-28 DIAGNOSIS — G62.9 NEUROPATHY: Primary | ICD-10-CM

## 2020-01-28 DIAGNOSIS — M79.671 PAIN IN BOTH FEET: ICD-10-CM

## 2020-01-28 DIAGNOSIS — M79.672 PAIN IN BOTH FEET: ICD-10-CM

## 2020-01-28 RX ORDER — AMITRIPTYLINE HYDROCHLORIDE 50 MG/1
50 TABLET, FILM COATED ORAL
Qty: 30 TAB | Refills: 5 | Status: SHIPPED | OUTPATIENT
Start: 2020-01-28 | End: 2020-07-24 | Stop reason: SDUPTHER

## 2020-01-28 RX ORDER — AMITRIPTYLINE HYDROCHLORIDE 25 MG/1
25 TABLET, FILM COATED ORAL
Qty: 30 TAB | Refills: 0 | Status: CANCELLED | OUTPATIENT
Start: 2020-01-28

## 2020-01-28 NOTE — PROGRESS NOTES
Anne Sheikh is a 45 y.o. male who presents for follow up for nerve pain in his feet b/l. Burning sensation and tingling have been present for years. Pain is present all the time, but worse with standing. Has been taking Elavil 25mg for about 6 months. Reports it is no longer helping. Reports that he first started Elavil 15mg which improved the sensation for only a couple of weeks. Then he made a follow up appointment and his Elavil was increased to 25mg and his pain was improved again for a couple of weeks but now is back. Medicine is still helping about 20-30%. Has been >1 yr since he has seen a neurologist. EMG was normal at his last neuro visit. PMHx:  Past Medical History:   Diagnosis Date    Arthritis        Meds:   Current Outpatient Medications   Medication Sig Dispense Refill    amitriptyline (ELAVIL) 25 mg tablet Take 1 Tab by mouth nightly. 30 Tab 0    acetaminophen (TYLENOL PO) Take  by mouth.          Allergies:   No Known Allergies    Smoker:  Social History     Tobacco Use   Smoking Status Former Smoker   Smokeless Tobacco Never Used       ETOH:   Social History     Substance and Sexual Activity   Alcohol Use Yes    Alcohol/week: 1.0 standard drinks    Types: 1 Cans of beer per week    Comment: social, 1 beer / 2 months       FH:   Family History   Problem Relation Age of Onset    Diabetes Mother     Hypertension Mother     Hypertension Father     No Known Problems Sister     No Known Problems Brother        ROS:  General/Constitutional:   No headache, fever, fatigue, weight loss or weight gain       Eyes:   No redness, pruritis, pain, visual changes, swelling, or discharge      Ears:    No pain, loss or changes in hearing     Neck:   No swelling, masses, stiffness, pain, or limited movement     Cardiac:    No chest pain      Respiratory:   No cough or shortness of breath     GI:   No nausea/vomiting, diarrhea, abdominal pain, bloody or dark stools       :   No dysuria or  hematuria Neurological:   No loss of consciousness, dizziness, seizures, dysarthria, cognitive changes, memory changes,  problems with balance, or unilateral weakness     Skin: No rash     Physical Exam:  Visit Vitals  /77 (BP 1 Location: Left arm, BP Patient Position: Sitting)   Pulse 69   Temp 98.9 °F (37.2 °C) (Oral)   Resp 16   Ht 6' 1\" (1.854 m)   Wt 237 lb (107.5 kg)   SpO2 100%   BMI 31.27 kg/m²     GEN: No apparent distress. Alert and oriented and responds to all questions appropriately. EYES:  Conjunctiva clear;       LUNGS: Respirations unlabored; clear to auscultation bilaterally  CARDIOVASCULAR: Regular, rate, and rhythm without murmurs, gallops or rubs   NEUROLOGIC:  No focal neurologic deficits. DTR WNL at patella and achilles. Monofilament WNL bilateral feet. Babinski WNL bilaterally. MSK: No obvious deformity of the foot and ankle bilaterally. FROM of ankle, knee and hip without pain. Strength intact in ankle, knee, and hip and no pain with strength testing. EXT: Well perfused. No edema. DP and PT pulses intact. SKIN: No obvious rashes. Normal hair pattern in the foot and lower ext. Assessment:  Neuropathy of both feet. Uncertain etiology. Plan: Will increase Elavil to 50mg daily. Recommend he f/u with neurology    RTC: 1 month and PRN.

## 2020-01-28 NOTE — PROGRESS NOTES
Chief Complaint   Patient presents with    Medication Refill     amitriptyline     1. Have you been to the ER, urgent care clinic since your last visit? Hospitalized since your last visit? No    2. Have you seen or consulted any other health care providers outside of the [address] [place] [address] since your last visit? Include any pap smears or colon screening.  No

## 2020-02-21 ENCOUNTER — OFFICE VISIT (OUTPATIENT)
Dept: FAMILY MEDICINE CLINIC | Age: 45
End: 2020-02-21

## 2020-02-21 VITALS
HEIGHT: 73 IN | OXYGEN SATURATION: 98 % | SYSTOLIC BLOOD PRESSURE: 115 MMHG | DIASTOLIC BLOOD PRESSURE: 73 MMHG | WEIGHT: 236 LBS | HEART RATE: 75 BPM | TEMPERATURE: 98.9 F | BODY MASS INDEX: 31.28 KG/M2

## 2020-02-21 DIAGNOSIS — G62.9 NEUROPATHY: Primary | ICD-10-CM

## 2020-02-21 NOTE — PROGRESS NOTES
Chief Complaint   Patient presents with    Medication Evaluation     Elavil     1. Have you been to the ER, urgent care clinic since your last visit? Hospitalized since your last visit? No    2. Have you seen or consulted any other health care providers outside of the [address] [place] [address] since your last visit? Include any pap smears or colon screening.  No

## 2020-02-21 NOTE — PROGRESS NOTES
Zac Neal is a 45 y.o. male who presents for bilateral nerve pain in his feet. Burning sensation and tingling have been present for many years. Extensive workup has been done without clear etiology. Elavil increased at last visit and this has helped with pain and denies side effects. PMHx:  Past Medical History:   Diagnosis Date    Arthritis        Meds:   Current Outpatient Medications   Medication Sig Dispense Refill    amitriptyline (ELAVIL) 50 mg tablet Take 1 Tab by mouth nightly. 30 Tab 5    acetaminophen (TYLENOL PO) Take  by mouth. Allergies:   No Known Allergies    Smoker:  Social History     Tobacco Use   Smoking Status Former Smoker   Smokeless Tobacco Never Used       ETOH:   Social History     Substance and Sexual Activity   Alcohol Use Yes    Alcohol/week: 1.0 standard drinks    Types: 1 Cans of beer per week    Comment: social, 1 beer / 2 months       FH:   Family History   Problem Relation Age of Onset    Diabetes Mother     Hypertension Mother     Hypertension Father     No Known Problems Sister     No Known Problems Brother        ROS:  Per HPI    Physical Exam:  There were no vitals taken for this visit. GEN: No apparent distress. Alert and oriented and responds to all questions appropriately. EYES:  Conjunctiva clear;       LUNGS: Respirations unlabored; clear to auscultation bilaterally  CARDIOVASCULAR: Regular, rate, and rhythm without murmurs, gallops or rubs   MSK: No obvious deformity of the foot and ankle bilaterally. FROM of ankle, knee and hip without pain. Strength intact in ankle, knee, and hip and no pain with strength testing. EXT: Well perfused. No edema. DP and PT pulses intact. SKIN: No obvious rashes. Normal hair pattern in the foot and lower ext. Assessment:    ICD-10-CM ICD-9-CM    1. Neuropathy G62.9 355.9      Neuropathy of both feet. Uncertain etiology.     Plan:  Continue Elavil to 50mg daily.  SE and risks of abrupt discontinuation discussed. Recommended f/u with neurology.     RTC: 3 month and PRN.

## 2020-07-24 NOTE — TELEPHONE ENCOUNTER
Dr. Nathaniel Weber   Received: Yesterday   Message Contents   Charmaine Delgado, [address] [facility] Office               Medication Refill     Caller (if not patient):       Relationship of caller (if not patient):       Best contact number(s):[phone]       Name of medication and dosage if known:   amitriptyline (ELAVIL) 50 mg tablet [[id number]]     Is patient out of this medication (yes/no): \[facility]\"" [address], [address] listed in chart? (yes/no): Y   Pharmacy phone number:       Details to clarify the request:

## 2020-07-29 RX ORDER — AMITRIPTYLINE HYDROCHLORIDE 50 MG/1
50 TABLET, FILM COATED ORAL
Qty: 30 TAB | Refills: 5 | Status: SHIPPED | OUTPATIENT
Start: 2020-07-29 | End: 2021-07-02

## 2020-07-29 NOTE — TELEPHONE ENCOUNTER
----- Message from Pati Mathews sent at 7/28/2020  7:06 PM EDT -----  Regarding: Dr. Marilee Watson  Medication Refill    Caller (if not patient):      Relationship of caller (if not patient):      Best contact number(s): [phone]      Name of medication and dosage if known:  amitriptyline (ELAVIL) 50 mg tablet [[id number]]    Is patient out of this medication (yes/no): [facility], [address] [place] [address] listed in chart? (yes/no):  Pharmacy phone number:      Details to clarify the request:Requested this over 48 hr ago only five pills left       Channel E Lucas Duarte Dr. Killian

## 2021-02-12 ENCOUNTER — VIRTUAL VISIT (OUTPATIENT)
Dept: FAMILY MEDICINE CLINIC | Age: 46
End: 2021-02-12

## 2021-02-12 DIAGNOSIS — R20.2 PARESTHESIA: Primary | ICD-10-CM

## 2021-02-12 PROCEDURE — 99213 OFFICE O/P EST LOW 20 MIN: CPT | Performed by: STUDENT IN AN ORGANIZED HEALTH CARE EDUCATION/TRAINING PROGRAM

## 2021-02-12 RX ORDER — PREGABALIN 75 MG/1
75 CAPSULE ORAL 2 TIMES DAILY
Qty: 60 CAP | Refills: 3 | Status: SHIPPED
Start: 2021-02-12 | End: 2021-07-02

## 2021-02-12 NOTE — PROGRESS NOTES
Reinier Bell  39 y.o. male  1975  [address]  [address] [place] [id number]  [id number]   [address]:    Telemedicine Progress Note  Remy Villanueva MD       Encounter Date and Time: February 12, 2021 at 4:33 PM    Consent:  He and/or the health care decision maker is aware that that he may receive a bill for this telephone service, depending on his insurance coverage, and has provided verbal consent to proceed: Yes    Chief Complaint   Patient presents with    Medication Refill     History of Present Illness   Monty Nagy is a 39 y.o. male was evaluated by synchronous (real-time) audio-video technology from home, through the [facility] Patient Portal.    Lower Extremity Paresthesia  - currently on Elavil 50mg QHS without good response at patient has ongoing numbness and tingling in his lower extremities   - interested in changing to Lyrica but could not afford previously  - no hx of angioedema nor severe allergic reactions     Review of Systems   Review of Systems   Neurological: Positive for tingling and sensory change. Negative for dizziness, tremors, focal weakness, seizures, weakness and headaches. Vitals/Objective:     General: alert, cooperative, no distress   Mental  status: mental status: alert, oriented to person, place, and time, normal mood, behavior, speech, dress, motor activity, and thought processes   Resp: resp: normal effort and no respiratory distress   Neuro: neuro: no gross deficits   Skin: skin: no discoloration or lesions of concern on visible areas   Due to this being a TeleHealth evaluation, many elements of the physical examination are unable to be assessed. Assessment and Plan:   Time-based coding, delete if not needed: I spent at least 15 minutes with this established patient, and >50% of the time was spent counseling and/or coordinating care regarding neuropathy    1. Paresthesia  - pregabalin (LYRICA) 75 mg capsule;  Take 1 Cap by mouth two (2) times a day. Max Daily Amount: 150 mg. Indications: nerve pain from spinal cord injury  Dispense: 60 Cap; Refill: 3      Time spent in direct conversation with the patient to include medical condition(s) discussed, assessment and treatment plan:       We discussed the expected course, resolution and complications of the diagnosis(es) in detail. Medication risks, benefits, costs, interactions, and alternatives were discussed as indicated. I advised him to contact the office if his condition worsens, changes or fails to improve as anticipated. He expressed understanding with the diagnosis(es) and plan. Patient understands that this encounter was a temporary measure, and the importance of further follow up and examination was emphasized. Patient verbalized understanding. Patient informed to follow up: Follow-up and Dispositions  ·   Return in about 4 weeks (around 3/12/2021). Electronically Signed: Xenia Salcido MD    Providers location when delivering service: hospital    CPT Codes 58745-21377 for Established Patients may apply to this Telehealth Visit. POS code: 18. Modifier GT      Pursuant to the emergency declaration under the [address] [place] [address], [facility] waiver authority and the [employer] Act, this Virtual  Visit was conducted, with patient's consent, to reduce the patient's risk of exposure to COVID-19 and provide continuity of care for an established patient. Services were provided through a video synchronous discussion virtually to substitute for in-person clinic visit. History   Patients past medical, surgical and family histories were reviewed and updated.       Past Medical History:   Diagnosis Date    Arthritis      Past Surgical History:   Procedure Laterality Date    HX CHOLECYSTECTOMY      HX ORTHOPAEDIC      right hand surgery 12  years ago     Family History   Problem Relation Age of Onset    Diabetes Mother     Hypertension Mother     Hypertension Father     No Known Problems Sister     No Known Problems Brother      Social History     Socioeconomic History    Marital status:      Spouse name: Not on file    Number of children: Not on file    Years of education: Not on file    Highest education level: Not on file   Occupational History    Not on file   Social Needs    Financial resource strain: Not on file    Food insecurity     Worry: Not on file     Inability: Not on file    Transportation needs     Medical: Not on file     Non-medical: Not on file   Tobacco Use    Smoking status: Former Smoker    Smokeless tobacco: Never Used   Substance and Sexual Activity    Alcohol use: Yes     Alcohol/week: 1.0 standard drinks     Types: 1 Cans of beer per week     Comment: social, 1 beer / 2 months    Drug use: No    Sexual activity: Yes     Partners: Female   Lifestyle    Physical activity     Days per week: Not on file     Minutes per session: Not on file    Stress: Not on file   Relationships    Social connections     Talks on phone: Not on file     Gets together: Not on file     Attends Caodaism service: Not on file     Active member of club or organization: Not on file     Attends meetings of clubs or organizations: Not on file     Relationship status: Not on file    Intimate partner violence     Fear of current or ex partner: Not on file     Emotionally abused: Not on file     Physically abused: Not on file     Forced sexual activity: Not on file   Other Topics Concern    Not on file   Social History Narrative    Not on file     Patient Active Problem List   Diagnosis Code    Pes planus of both feet M21.41, M21.42    Paresthesia R20.2          Current Medications/Allergies   Medications and Allergies reviewed:    Current Outpatient Medications   Medication Sig Dispense Refill    pregabalin (LYRICA) 75 mg capsule Take 1 Cap by mouth two (2) times a day.  Max Daily Amount: 150 mg. Indications: nerve pain from spinal cord injury 60 Cap 3    amitriptyline (ELAVIL) 50 mg tablet Take 1 Tab by mouth nightly. 30 Tab 5    acetaminophen (TYLENOL PO) Take  by mouth.        No Known Allergies

## 2021-06-30 ENCOUNTER — TELEPHONE (OUTPATIENT)
Dept: FAMILY MEDICINE CLINIC | Age: 46
End: 2021-06-30

## 2021-06-30 NOTE — TELEPHONE ENCOUNTER
----- Message from Sitrion sent at 6/30/2021 12:09 PM EDT -----  Regarding: Dr. Saray Cerrato/Medication Refill  Medication Refill    Caller (if not patient): Pt      Relationship of caller (if not patient): Pt      Best contact number(s): [phone]      Name of medication and dosage if known: \"pregabalin (LYRICA) 75 mg capsule\"      Is patient out of this medication (yes/no): no      Pharmacy name: [facility] listed in chart? (yes/no): yes  Pharmacy phone number: Phone:  [phone]  Fax:  [phone]       Details to clarify the request: pt would like to discuss this medication with provider and come in to the office but there are no appts available/please reach out to pt to schedule if this is an option      Sitrion

## 2021-06-30 NOTE — TELEPHONE ENCOUNTER
Pt calling back for status of message left by call center. He states the person he talked to gave him a number to call for care card status. He also states they told him his previous request was denied as they didn't receive application. Informed pt of new process for financial assistance. He will ask for application at next visit. Pt wanted to know if he has to pay as he is without insurance. I did let him know that patients are all responsible for bills that they accumulate. I did tell him a $ 50.00 deposit is asked of uninsured patients as a deposit in that he will be billed remainder. I let him know to avoid collections to make pymt on bills. I also let him know to hold on to all bills for care card processing depending approval. He understands.

## 2021-07-02 ENCOUNTER — OFFICE VISIT (OUTPATIENT)
Dept: FAMILY MEDICINE CLINIC | Age: 46
End: 2021-07-02

## 2021-07-02 VITALS
RESPIRATION RATE: 16 BRPM | TEMPERATURE: 97.9 F | DIASTOLIC BLOOD PRESSURE: 69 MMHG | SYSTOLIC BLOOD PRESSURE: 102 MMHG | BODY MASS INDEX: 31.41 KG/M2 | HEIGHT: 73 IN | HEART RATE: 70 BPM | OXYGEN SATURATION: 100 % | WEIGHT: 237 LBS

## 2021-07-02 DIAGNOSIS — G62.9 NEUROPATHY: ICD-10-CM

## 2021-07-02 DIAGNOSIS — R20.2 PARESTHESIA: Primary | ICD-10-CM

## 2021-07-02 DIAGNOSIS — B35.1 ONYCHOMYCOSIS: ICD-10-CM

## 2021-07-02 PROCEDURE — 99213 OFFICE O/P EST LOW 20 MIN: CPT | Performed by: STUDENT IN AN ORGANIZED HEALTH CARE EDUCATION/TRAINING PROGRAM

## 2021-07-02 RX ORDER — CICLOPIROX 80 MG/ML
6.6 SOLUTION TOPICAL
Qty: 1 BOTTLE | Refills: 1 | Status: SHIPPED | OUTPATIENT
Start: 2021-07-02 | End: 2021-09-30

## 2021-07-02 RX ORDER — DULOXETIN HYDROCHLORIDE 30 MG/1
60 CAPSULE, DELAYED RELEASE ORAL DAILY
Qty: 60 CAPSULE | Refills: 0 | Status: SHIPPED | OUTPATIENT
Start: 2021-07-02

## 2021-07-02 NOTE — PATIENT INSTRUCTIONS
Hongos en las uñas de los pies: Instrucciones de cuidado  Toenail Fungus: Care Instructions  Instrucciones de cuidado  [place] del pie infectada por un hongo, por lo general, se torna blancuzca o amarillenta. A medida que el hongo se propaga, la uña se oscurece y Linda, y los bordes comienzan a descamarse y quebrarse. Geo infección severa puede causar dolor en el dedo del pie y que la uña se desprenda del dedo. Es más probable que las uñas de los dedos que están expuestas a la humedad y al calor se infecten por un hongo. [place] podría ocurrir debido al uso frecuente de calzado que jasmyn sudar mucho el pie y a caminar descalzo sobre pisos de duchas. Los hongos de las uñas del pie son difíciles de tratar y la infección puede volver a producirse geo vez que se la ha eliminado. Sin embargo, a veces, los [employer] pueden [employer] de las uñas del pie para siempre. Si la infección es muy grave, o si causa dolor intenso, podría ser necesario extraer la uña. La atención de seguimiento es geo parte clave de enriquez tratamiento y seguridad. Asegúrese de hacer y acudir a todas las citas, y llame a enriquez médico si está teniendo problemas. También es geo buena idea saber los resultados de ramone exámenes y mantener geo lista de los medicamentos que maynor. Cómo puede cuidarse en el hogar? · [place] los medicamentos exactamente según las indicaciones. Llame a enriquez médico si tiene algún problema con ramone medicamentos. Usted recibirá [employer] medicamentos específicos recetados por enriquez médico.  · Si enriquez médico le indicó geo crema o un líquido para aplicarse sobre la uña del pie, utilícelo exactamente según las indicaciones. · Lávese los pies con frecuencia y [place] maricel después de tocarse los pies. · [address] uñas del pie secas y limpias. Séquese los pies por completo después de kat un baño, y antes de calzarse y [employer]. · Mantenga cortas las uñas de los pies.   · [employer] con frecuencia. Use medias secas que absorban la humedad. · No camine descalzo en lugares públicos. · Utilice un aerosol o talco que combata los hongos en los pies y los zapatos. · No se rasque la piel alrededor [employer]. · No use esmalte de uñas ni uñas postizas en la uñas de los dedos del pie. Cuándo debe pedir ayuda? Llame a enriquez médico ahora mismo o busque atención médica inmediata si:    · Tiene signos de infección, tales zora:  ? Aumento del dolor, la hinchazón, el enrojecimiento o la temperatura. ? Vetas rojizas que salen de geo herida. ? Pus que drena del sitio. ? Katherine Blanco.     · Tiene un dolor nuevo o más intenso en el dedo del pie. Preste especial atención a los cambios en enriquez fallon y asegúrese de comunicarse con enriquez médico si:    · No mejora zora se esperaba. Dónde puede encontrar más información en inglés? Vaya a http://www.gray.com/  Leoncio D202 en la búsqueda para aprender más acerca de \"Hongos en las uñas de los pies: Instrucciones de cuidado. \"  Revisado: 2 julio, 8996               TYMKCJS del contenido: 12.8  © 2006-2021 Healthwise, Incorporated. Las instrucciones de cuidado fueron adaptadas bajo licencia por [employer] (which disclaims liability or warranty for this information). Si usted tiene Preble Leander afección médica o sobre estas instrucciones, siempre pregunte a enriquez profesional de fallon. Healthwise, Incorporated niega toda garantía o responsabilidad por enriquez uso de esta información.

## 2021-07-02 NOTE — PROGRESS NOTES
[address] [facility] [address] [facility]   Office [phone]  Fax [phone]     7/2/2021   Chief Complaint   Patient presents with    Leg Pain     Patient here today because pain, hot burning sensation, numbness, and tingling in legs is not better since starting Lyrica. Patient states it has actually been worse in the last 3-4 weeks. Daphne Hilton HPI:  Karla Kan is a 39 y.o. male who presents to clinic today for paraesthesia of BLE. Lower Extremity Paresthesia  - worsening burning in BLE. Burning/numbess/pain up to ankles    - Currently on Lyrica without improvement   - previously tried amitriptyline and gabapentin     - Extensive work up in the past, including work up by neurologist was normal     Patients past medical, surgical and family histories were reviewed. Allergies and Medications reviewed and updated. Review of Systems   Cardiovascular: Negative for leg swelling. Skin: Negative for rash. Neurological: Positive for tingling and sensory change. Negative for focal weakness and weakness. Objective:  Vitals:    07/02/21 1005   BP: 102/69   Pulse: 70   Resp: 16   Temp: 97.9 °F (36.6 °C)   TempSrc: Temporal   SpO2: 100%   Weight: 237 lb (107.5 kg)   Height: 6' 1\" (1.854 m)     Body mass index is 31.27 kg/m². Physical Exam  General: Patient alert and oriented and in NAD  HEENT: PER/EOMI, no conjunctival pallor or scleral icterus. Heart: Regular rate and rhythm, No murmurs, rubs or gallops.   2+ peripheral pulses  Lungs: Clear to auscultation bilaterally, no wheezing, rales or rhonchi  Abd: +BS, non-tender, non-distended  Ext: No edema  Skin: No rashes or lesions noted on exposed skin  Psych: Appropriate mood and affect  Diabetic foot exam:      Sensation by vibration sense:  good  Monofilament test:  Good - no abnormalities   Skin integrity:  intact without lesions  Vascular:  good circulation  Motor:  moves all toes, strength seems ok    +onychomycosis      No results found for this or any previous visit (from the past 24 hour(s)). Assessment and Plan:  1. Paresthesia + 2. Neuropathy  - DULoxetine (CYMBALTA) 30 mg capsule; Take 2 Capsules by mouth daily. Dispense: 60 Capsule; Refill: 0  - Declined referral to pain specialist due to not having insurance  - Has failed multiple treatments, including amytriptilline/gabapentin/lyrica will try duloxetine   - Discussed risks/benefits/side effects of new medication     3. Onychomycosis  - Mild - will try topical therapy   - ciclopirox (Penlac) 8 % solution; Apply 6.6 mL to affected area nightly for 90 days. Dispense: 1 Bottle; Refill: 1      Follow-up and Dispositions    · Return in about 4 weeks (around 7/30/2021) for Virtual visit to follow up on medication. I have discussed the aforementioned diagnoses and plan with the patient in detail. I have provided information in person and/or in AVS. All questions answered prior to discharge.      I discussed this patient with Dr. Sally Farrell (Attending Physician)   Signed By:  Sienna Chowdary MD     Family Medicine Resident normal

## 2021-07-02 NOTE — PROGRESS NOTES
Chief Complaint   Patient presents with    Leg Pain     Patient here today because pain, hot burning sensation, numbness, and tingling in legs is not better since starting Lyrica. Patient states it has actually been worse in the last 3-4 weeks. .     1. Have you been to the ER, urgent care clinic since your last visit? Hospitalized since your last visit? no    2. Have you seen or consulted any other health care providers outside of the [address] [place] [address] since your last visit? Include any pap smears or colon screening.  no

## 2022-03-18 PROBLEM — M21.42 PES PLANUS OF BOTH FEET: Status: ACTIVE | Noted: 2017-07-13

## 2022-03-18 PROBLEM — R20.2 PARESTHESIA: Status: ACTIVE | Noted: 2018-04-30

## 2022-03-18 PROBLEM — M21.41 PES PLANUS OF BOTH FEET: Status: ACTIVE | Noted: 2017-07-13

## 2022-07-15 ENCOUNTER — APPOINTMENT (OUTPATIENT)
Dept: GENERAL RADIOLOGY | Age: 47
End: 2022-07-15
Attending: STUDENT IN AN ORGANIZED HEALTH CARE EDUCATION/TRAINING PROGRAM

## 2022-07-15 ENCOUNTER — HOSPITAL ENCOUNTER (EMERGENCY)
Age: 47
Discharge: HOME | End: 2022-07-15
Attending: STUDENT IN AN ORGANIZED HEALTH CARE EDUCATION/TRAINING PROGRAM

## 2022-07-15 VITALS
TEMPERATURE: 98.7 F | RESPIRATION RATE: 16 BRPM | BODY MASS INDEX: 30.48 KG/M2 | HEIGHT: 73 IN | WEIGHT: 230 LBS | DIASTOLIC BLOOD PRESSURE: 87 MMHG | SYSTOLIC BLOOD PRESSURE: 138 MMHG | HEART RATE: 60 BPM | OXYGEN SATURATION: 100 %

## 2022-07-15 DIAGNOSIS — M77.8 LEFT WRIST TENDONITIS: Primary | ICD-10-CM

## 2022-07-15 PROCEDURE — 73110 X-RAY EXAM OF WRIST: CPT

## 2022-07-15 PROCEDURE — 99283 EMERGENCY DEPT VISIT LOW MDM: CPT

## 2022-07-15 NOTE — ED TRIAGE NOTES
Pt seen by provider in triage. ER process and expectation explained to pt. Pt with verbal understanding.

## 2022-07-15 NOTE — ED PROVIDER NOTES
Chief Complaint   Patient presents with    Wrist Pain    Arm Pain    Hand Pain     This is a 72-year-old right-handed male presenting with pain and stiffness in his left wrist for the past 3-4 days, without any history of antecedent trauma. Pain is worse with range of motion. 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 Reports a \"hot\" sensation in the center of his hand but otherwise denies any numbness or weakness in the digits of the left hand. Using over the counter analgesics at home without any relief. No fevers, chest pain, shortness of breath, abdominal pain, or any other systemic complaints. Symptoms are moderate in nature without alleviating factors. Past Medical History:   Diagnosis Date    Arthritis     Paresthesia        Past Surgical History:   Procedure Laterality Date    HX CHOLECYSTECTOMY      HX ORTHOPAEDIC      right hand surgery 12  years ago         Family History:   Problem Relation Age of Onset   Moisés Amador Diabetes Mother     Hypertension Mother     Hypertension Father     No Known Problems Sister     No Known Problems Brother        Social History     Socioeconomic History    Marital status:      Spouse name: Not on file    Number of children: Not on file    Years of education: Not on file    Highest education level: Not on file   Occupational History    Not on file   Tobacco Use    Smoking status: Former Smoker    Smokeless tobacco: Never Used   Vaping Use    Vaping Use: Never used   Substance and Sexual Activity    Alcohol use:  Yes     Alcohol/week: 1.0 standard drink     Types: 1 Cans of beer per week     Comment: social, 1 beer / 2 months    Drug use: No    Sexual activity: Yes     Partners: Female   Other Topics Concern    Not on file   Social History Narrative    Not on file     Social Determinants of Health     Financial Resource Strain:     Difficulty of Paying Living Expenses: Not on file   Food Insecurity:     Worried About Running Out of Food in the Last Year: Not on file    Ran Out of Food in the Last Year: Not on file   Transportation Needs:     Lack of Transportation (Medical): Not on file    Lack of Transportation (Non-Medical): Not on file   Physical Activity:     Days of Exercise per Week: Not on file    Minutes of Exercise per Session: Not on file   Stress:     Feeling of Stress : Not on file   Social Connections:     Frequency of Communication with Friends and Family: Not on file    Frequency of Social Gatherings with Friends and Family: Not on file    Attends Taoism Services: Not on file    Active Member of [address] [place] [address] Desk or Organizations: Not on file    Attends Club or Organization Meetings: Not on file    Marital Status: Not on file   Intimate Partner Violence:     Fear of Current or Ex-Partner: Not on file    Emotionally Abused: Not on file    Physically Abused: Not on file    Sexually Abused: Not on file   Housing Stability:     Unable to Pay for Housing in the Last Year: Not on file    Number of [place] in the Last Year: Not on file    Unstable Housing in the Last Year: Not on file         ALLERGIES: Patient has no known allergies. Review of Systems   Constitutional: Negative for fever. Respiratory: Negative for shortness of breath. Cardiovascular: Negative for chest pain. Gastrointestinal: Negative for abdominal pain and vomiting. Musculoskeletal: Positive for arthralgias.        Vitals:    07/15/22 1115   BP: 138/87   Pulse: 60   Resp: 16   Temp: 98.7 °F (37.1 °C)   SpO2: 100%   Weight: 104.3 kg (230 lb)   Height: 6' 1\" (1.854 m)            Physical Exam  General:  Awake and alert, NAD  HEENT:  NC/AT, equal pupils, moist mucous membranes  Neck:   Normal inspection, full range of motion  Cardiac:  RRR, no murmurs  Respiratory:  Clear bilaterally, no wheezes, rales, rhonchi  Abdomen:  Soft and nontender, nondistended  Extremities: Warm and well perfused, +tender to palpation across the radial aspect of the left wrist and base of the left thumb without deformity, open wounds, or soft tissue swelling, NV intact distally  Neuro:  Moving all extremities symmetrically without gross motor deficit  Skin:   No rashes or pallor    RESULTS  No results found for this or any previous visit (from the past 12 hour(s)). IMAGING  XR WRIST LT AP/LAT/OBL MIN 3V    Result Date: 7/15/2022  No acute abnormality. Procedures - none unless documented below    ED course: XR reviewed, suspect tenosynovitis of the left wrist.  Recommend conservative therapy with NSAID's and bracing, close follow up with orthopedics to be reevaluated.     Impression: Left wrist tendonitis  Disposition: Discharge home

## 2022-07-15 NOTE — ED NOTES
Pt was discharged at this time. pt verbalized understanding of all discharge instructions. Pt remains a&ox3. Resps are even and unlabored. Skin warm and dry. No distress noted. Pt ambulated out of ed with a steady gait. Pt was evaluated and discharged from the waiting room, without RN assessment. Please see providers assessment.

## 2023-01-06 ENCOUNTER — TELEPHONE (OUTPATIENT)
Dept: FAMILY MEDICINE CLINIC | Age: 48
End: 2023-01-06

## 2023-01-17 ENCOUNTER — OFFICE VISIT (OUTPATIENT)
Dept: FAMILY MEDICINE CLINIC | Age: 48
End: 2023-01-17

## 2023-01-17 VITALS
HEIGHT: 71 IN | HEART RATE: 63 BPM | OXYGEN SATURATION: 99 % | DIASTOLIC BLOOD PRESSURE: 76 MMHG | SYSTOLIC BLOOD PRESSURE: 123 MMHG | BODY MASS INDEX: 32.9 KG/M2 | WEIGHT: 235 LBS

## 2023-01-17 DIAGNOSIS — B35.1 ONYCHOMYCOSIS: ICD-10-CM

## 2023-01-17 DIAGNOSIS — G62.9 NEUROPATHY: Primary | ICD-10-CM

## 2023-01-17 DIAGNOSIS — Z00.00 ANNUAL PHYSICAL EXAM: ICD-10-CM

## 2023-01-17 LAB
ALBUMIN SERPL-MCNC: 3.9 G/DL (ref 3.5–5)
ALBUMIN/GLOB SERPL: 1.1 (ref 1.1–2.2)
ALP SERPL-CCNC: 48 U/L (ref 45–117)
ALT SERPL-CCNC: 29 U/L (ref 12–78)
ANION GAP SERPL CALC-SCNC: 4 MMOL/L (ref 5–15)
AST SERPL-CCNC: 19 U/L (ref 15–37)
BILIRUB SERPL-MCNC: 0.4 MG/DL (ref 0.2–1)
BUN SERPL-MCNC: 18 MG/DL (ref 6–20)
BUN/CREAT SERPL: 22 (ref 12–20)
CALCIUM SERPL-MCNC: 9.2 MG/DL (ref 8.5–10.1)
CHLORIDE SERPL-SCNC: 107 MMOL/L (ref 97–108)
CHOLEST SERPL-MCNC: 156 MG/DL
CO2 SERPL-SCNC: 29 MMOL/L (ref 21–32)
COMMENT, HOLDF: NORMAL
CREAT SERPL-MCNC: 0.83 MG/DL (ref 0.7–1.3)
ERYTHROCYTE [DISTWIDTH] IN BLOOD BY AUTOMATED COUNT: 13.2 % (ref 11.5–14.5)
GLOBULIN SER CALC-MCNC: 3.7 G/DL (ref 2–4)
GLUCOSE SERPL-MCNC: 92 MG/DL (ref 65–100)
HCT VFR BLD AUTO: 44.6 % (ref 36.6–50.3)
HDLC SERPL-MCNC: 38 MG/DL
HDLC SERPL: 4.1 (ref 0–5)
HGB BLD-MCNC: 14.4 G/DL (ref 12.1–17)
LDLC SERPL CALC-MCNC: 102.2 MG/DL (ref 0–100)
MCH RBC QN AUTO: 29.8 PG (ref 26–34)
MCHC RBC AUTO-ENTMCNC: 32.3 G/DL (ref 30–36.5)
MCV RBC AUTO: 92.1 FL (ref 80–99)
NRBC # BLD: 0 K/UL (ref 0–0.01)
NRBC BLD-RTO: 0 PER 100 WBC
PLATELET # BLD AUTO: 207 K/UL (ref 150–400)
PMV BLD AUTO: 11.6 FL (ref 8.9–12.9)
POTASSIUM SERPL-SCNC: 4.5 MMOL/L (ref 3.5–5.1)
PROT SERPL-MCNC: 7.6 G/DL (ref 6.4–8.2)
RBC # BLD AUTO: 4.84 M/UL (ref 4.1–5.7)
SAMPLES BEING HELD,HOLD: NORMAL
SODIUM SERPL-SCNC: 140 MMOL/L (ref 136–145)
TRIGL SERPL-MCNC: 79 MG/DL (ref ?–150)
VLDLC SERPL CALC-MCNC: 15.8 MG/DL
WBC # BLD AUTO: 6.1 K/UL (ref 4.1–11.1)

## 2023-01-17 PROCEDURE — 99213 OFFICE O/P EST LOW 20 MIN: CPT | Performed by: FAMILY MEDICINE

## 2023-01-17 RX ORDER — CICLOPIROX 80 MG/ML
SOLUTION TOPICAL
Qty: 6.6 ML | Refills: 0 | Status: SHIPPED | OUTPATIENT
Start: 2023-01-17

## 2023-01-17 NOTE — PROGRESS NOTES
[address] [facility]      Mago Almendarez is a 41 y.o. male who presents for physical. His concerns today include: toenail infection, paresthesias of b/l feet, dry skin on feet. Toenail infection  - Present on L big toe for years, was given ciclopirox in 2021 and applied it topically for 7 days at a time and then would take a break for a few weeks. Did not notice any improvement     Paresthesias b/l feet: Present for ~5 years, worsening  - + Numbness and pain, limiting his activity   - Seen by neurology in 2018, labwork including 2 hr OGTT, TSH, Vit B12, Folate, SPEP/HEATHER, ACE, Anti Gliadin Ab, ESR, SONG, Vit B6 all unremarkable. EMG normal. Tarsal tunnel CSI gave no benefit. - Tried on amitriptilline/gabapentin/lyrica/cymbalta previously. Meds either didn't work or had side effects    Dry skin  - On plantar aspect of feet b/l    Health Maint:   Diet: tries to eat a lot of vegetables, fruits, chicken.  Occasionally will eat red meet, fried foods  Exercise: no formal exercise due to pain in feet  Social: former smoker, smoked 1 cig/ day for 5 years, quit about 20 years ago  Alcohol: social  Illicit drug use: none   Flu vaccine: declines      PMHx:  Past Medical History:   Diagnosis Date    Arthritis     Paresthesia      Meds:     Allergies:   No Known Allergies    Smoker:  Social History     Tobacco Use   Smoking Status Former   Smokeless Tobacco Never     ETOH:   Social History     Substance and Sexual Activity   Alcohol Use Yes    Alcohol/week: 1.0 standard drink    Types: 1 Cans of beer per week    Comment: social, 1 beer / 2 months       FH:   Family History   Problem Relation Age of Onset    Diabetes Mother     Hypertension Mother     Hypertension Father     No Known Problems Sister     No Known Problems Brother      ROS:  General/Constitutional:   No headache, fever, fatigue, weight loss or weight gain       Eyes:   No redness, pruritis, pain, visual changes, swelling, or discharge      Cardiac:    No chest pain      Respiratory:   No cough or shortness of breath     GI:   No nausea/vomiting, diarrhea, abdominal pain, bloody or dark stools       :   No dysuria or  hematuria    Neurological:   No loss of consciousness, dizziness, seizures, memory changes  Skin: No rash     Physical Exam:  Visit Vitals  BP (!) 144/89 (BP 1 Location: Right arm, BP Patient Position: Sitting, BP Cuff Size: Adult)   Pulse (!) 59   Ht 5' 10.87\" (1.8 m)   Wt 235 lb (106.6 kg)   SpO2 98%   BMI 32.90 kg/m²     GEN: No apparent distress. Alert and oriented and responds to all questions appropriately. EYES:  Conjunctiva clear; pupils round and reactive to light; extraocular movements are intact. EAR: External ears are normal.  Tympanic membranes are clear and without effusion. OROPHYARYNX: No oral lesions or exudates. NECK:  Supple; no masses; thyroid normal           LUNGS: Respirations unlabored; clear to auscultation bilaterally  CARDIOVASCULAR: Regular, rate, and rhythm without murmurs, gallops or rubs   ABDOMEN: Soft; nontender; nondistended; normoactive bowel sounds; no masses or organomegaly  NEUROLOGIC:  No focal neurologic deficits. Coordination and gait grossly intact. EXT: Well perfused. No edema. L big toe with onychomycosis of medial portion  SKIN: No obvious rashes. Dry skin plantar aspect of feet b/l. Assessment/Plan:  - Counseled on diet/exercise. Tried aquatic exercise due to foot pain. - Annual labs as above  - Follow up with neurology for paresthesias. In last note from 2018- recommended possible QSWEAT and IENF skin biopsy if workup normal  - Ciclopirox daily for onychomycosis. Instructed appropriate use- apply topically daily x 1 week then wipe off with nail polish remover.  Continue for a total of 3 months   - OTC tinactin for athletes foot

## 2023-01-17 NOTE — PROGRESS NOTES
Patient is here for physical an blood work,an would like a folic acid  He had black spot on his feet for years, hes concerned that his  big toe on the left foot has fungus. (Ciclopirox)  Chief Complaint   Patient presents with    Complete Physical     Visit Vitals  BP (!) 144/89 (BP 1 Location: Right arm, BP Patient Position: Sitting, BP Cuff Size: Adult)   Pulse (!) 59   Ht 5' 10.87\" (1.8 m)   Wt 235 lb (106.6 kg)   SpO2 98%   BMI 32.90 kg/m²